# Patient Record
Sex: FEMALE | NOT HISPANIC OR LATINO | Employment: UNEMPLOYED | URBAN - METROPOLITAN AREA
[De-identification: names, ages, dates, MRNs, and addresses within clinical notes are randomized per-mention and may not be internally consistent; named-entity substitution may affect disease eponyms.]

---

## 2023-10-25 ENCOUNTER — HOSPITAL ENCOUNTER (INPATIENT)
Facility: HOSPITAL | Age: 33
LOS: 2 days | Discharge: HOME | DRG: 871 | End: 2023-10-28
Attending: EMERGENCY MEDICINE | Admitting: STUDENT IN AN ORGANIZED HEALTH CARE EDUCATION/TRAINING PROGRAM
Payer: COMMERCIAL

## 2023-10-25 ENCOUNTER — APPOINTMENT (OUTPATIENT)
Dept: RADIOLOGY | Facility: HOSPITAL | Age: 33
DRG: 871 | End: 2023-10-25
Attending: PHYSICIAN ASSISTANT
Payer: COMMERCIAL

## 2023-10-25 DIAGNOSIS — J45.901 ASTHMA EXACERBATION: ICD-10-CM

## 2023-10-25 DIAGNOSIS — E61.1 IRON DEFICIENCY: Primary | ICD-10-CM

## 2023-10-25 DIAGNOSIS — R00.0 SINUS TACHYCARDIA: ICD-10-CM

## 2023-10-25 DIAGNOSIS — J18.9 PNEUMONIA OF LEFT LOWER LOBE DUE TO INFECTIOUS ORGANISM: ICD-10-CM

## 2023-10-25 DIAGNOSIS — A41.9 SEPSIS (H): ICD-10-CM

## 2023-10-25 LAB
ANION GAP SERPL CALCULATED.3IONS-SCNC: 17 MMOL/L (ref 7–15)
BUN SERPL-MCNC: 11.7 MG/DL (ref 6–20)
CALCIUM SERPL-MCNC: 9 MG/DL (ref 8.6–10)
CHLORIDE SERPL-SCNC: 101 MMOL/L (ref 98–107)
CREAT SERPL-MCNC: 0.58 MG/DL (ref 0.51–0.95)
DEPRECATED HCO3 PLAS-SCNC: 17 MMOL/L (ref 22–29)
EGFRCR SERPLBLD CKD-EPI 2021: >90 ML/MIN/1.73M2
ERYTHROCYTE [DISTWIDTH] IN BLOOD BY AUTOMATED COUNT: 14.5 % (ref 10–15)
FLUAV RNA SPEC QL NAA+PROBE: NEGATIVE
FLUBV RNA RESP QL NAA+PROBE: NEGATIVE
GLUCOSE SERPL-MCNC: 177 MG/DL (ref 70–99)
GROUP A STREP BY PCR: NOT DETECTED
HCG SERPL QL: NEGATIVE
HCT VFR BLD AUTO: 40.9 % (ref 35–47)
HGB BLD-MCNC: 13 G/DL (ref 11.7–15.7)
LACTATE SERPL-SCNC: 1.3 MMOL/L (ref 0.7–2)
MAGNESIUM SERPL-MCNC: 1.7 MG/DL (ref 1.7–2.3)
MCH RBC QN AUTO: 24.5 PG (ref 26.5–33)
MCHC RBC AUTO-ENTMCNC: 31.8 G/DL (ref 31.5–36.5)
MCV RBC AUTO: 77 FL (ref 78–100)
PLATELET # BLD AUTO: 127 10E3/UL (ref 150–450)
POTASSIUM SERPL-SCNC: 3.2 MMOL/L (ref 3.4–5.3)
RBC # BLD AUTO: 5.3 10E6/UL (ref 3.8–5.2)
RSV RNA SPEC NAA+PROBE: NEGATIVE
SARS-COV-2 RNA RESP QL NAA+PROBE: NEGATIVE
SODIUM SERPL-SCNC: 135 MMOL/L (ref 135–145)
TSH SERPL DL<=0.005 MIU/L-ACNC: 2.22 UIU/ML (ref 0.3–4.2)
WBC # BLD AUTO: 21 10E3/UL (ref 4–11)

## 2023-10-25 PROCEDURE — 36415 COLL VENOUS BLD VENIPUNCTURE: CPT | Performed by: PHYSICIAN ASSISTANT

## 2023-10-25 PROCEDURE — 80048 BASIC METABOLIC PNL TOTAL CA: CPT | Performed by: PHYSICIAN ASSISTANT

## 2023-10-25 PROCEDURE — 93005 ELECTROCARDIOGRAM TRACING: CPT | Performed by: EMERGENCY MEDICINE

## 2023-10-25 PROCEDURE — 93005 ELECTROCARDIOGRAM TRACING: CPT | Performed by: PHYSICIAN ASSISTANT

## 2023-10-25 PROCEDURE — 99285 EMERGENCY DEPT VISIT HI MDM: CPT | Mod: 25

## 2023-10-25 PROCEDURE — 250N000013 HC RX MED GY IP 250 OP 250 PS 637: Performed by: PHYSICIAN ASSISTANT

## 2023-10-25 PROCEDURE — 84703 CHORIONIC GONADOTROPIN ASSAY: CPT | Performed by: PHYSICIAN ASSISTANT

## 2023-10-25 PROCEDURE — 83735 ASSAY OF MAGNESIUM: CPT | Performed by: PHYSICIAN ASSISTANT

## 2023-10-25 PROCEDURE — 87637 SARSCOV2&INF A&B&RSV AMP PRB: CPT | Performed by: PHYSICIAN ASSISTANT

## 2023-10-25 PROCEDURE — 71046 X-RAY EXAM CHEST 2 VIEWS: CPT

## 2023-10-25 PROCEDURE — 250N000009 HC RX 250: Performed by: PHYSICIAN ASSISTANT

## 2023-10-25 PROCEDURE — 94640 AIRWAY INHALATION TREATMENT: CPT

## 2023-10-25 PROCEDURE — 258N000003 HC RX IP 258 OP 636: Performed by: PHYSICIAN ASSISTANT

## 2023-10-25 PROCEDURE — 250N000012 HC RX MED GY IP 250 OP 636 PS 637: Performed by: PHYSICIAN ASSISTANT

## 2023-10-25 PROCEDURE — 96360 HYDRATION IV INFUSION INIT: CPT

## 2023-10-25 PROCEDURE — 84443 ASSAY THYROID STIM HORMONE: CPT | Performed by: PHYSICIAN ASSISTANT

## 2023-10-25 PROCEDURE — 85027 COMPLETE CBC AUTOMATED: CPT | Performed by: PHYSICIAN ASSISTANT

## 2023-10-25 PROCEDURE — 87651 STREP A DNA AMP PROBE: CPT | Performed by: PHYSICIAN ASSISTANT

## 2023-10-25 PROCEDURE — 83605 ASSAY OF LACTIC ACID: CPT | Performed by: PHYSICIAN ASSISTANT

## 2023-10-25 RX ORDER — ALBUTEROL SULFATE 0.83 MG/ML
5 SOLUTION RESPIRATORY (INHALATION) ONCE
Status: COMPLETED | OUTPATIENT
Start: 2023-10-25 | End: 2023-10-25

## 2023-10-25 RX ORDER — ACETAMINOPHEN 325 MG/1
975 TABLET ORAL ONCE
Status: COMPLETED | OUTPATIENT
Start: 2023-10-25 | End: 2023-10-25

## 2023-10-25 RX ORDER — IBUPROFEN 600 MG/1
600 TABLET, FILM COATED ORAL ONCE
Status: COMPLETED | OUTPATIENT
Start: 2023-10-25 | End: 2023-10-25

## 2023-10-25 RX ORDER — IPRATROPIUM BROMIDE AND ALBUTEROL SULFATE 2.5; .5 MG/3ML; MG/3ML
3 SOLUTION RESPIRATORY (INHALATION) ONCE
Status: COMPLETED | OUTPATIENT
Start: 2023-10-25 | End: 2023-10-25

## 2023-10-25 RX ORDER — PREDNISONE 20 MG/1
40 TABLET ORAL ONCE
Status: COMPLETED | OUTPATIENT
Start: 2023-10-25 | End: 2023-10-25

## 2023-10-25 RX ADMIN — ALBUTEROL SULFATE 5 MG: 2.5 SOLUTION RESPIRATORY (INHALATION) at 21:58

## 2023-10-25 RX ADMIN — ALBUTEROL SULFATE 5 MG: 2.5 SOLUTION RESPIRATORY (INHALATION) at 21:46

## 2023-10-25 RX ADMIN — IPRATROPIUM BROMIDE AND ALBUTEROL SULFATE 3 ML: 2.5; .5 SOLUTION RESPIRATORY (INHALATION) at 21:58

## 2023-10-25 RX ADMIN — IBUPROFEN 600 MG: 600 TABLET, FILM COATED ORAL at 20:31

## 2023-10-25 RX ADMIN — ACETAMINOPHEN 975 MG: 325 TABLET ORAL at 20:30

## 2023-10-25 RX ADMIN — PREDNISONE 40 MG: 20 TABLET ORAL at 21:49

## 2023-10-25 RX ADMIN — SODIUM CHLORIDE 1000 ML: 9 INJECTION, SOLUTION INTRAVENOUS at 23:30

## 2023-10-25 ASSESSMENT — ACTIVITIES OF DAILY LIVING (ADL)
ADLS_ACUITY_SCORE: 33
ADLS_ACUITY_SCORE: 35

## 2023-10-26 ENCOUNTER — APPOINTMENT (OUTPATIENT)
Dept: CT IMAGING | Facility: HOSPITAL | Age: 33
DRG: 871 | End: 2023-10-26
Attending: STUDENT IN AN ORGANIZED HEALTH CARE EDUCATION/TRAINING PROGRAM
Payer: COMMERCIAL

## 2023-10-26 PROBLEM — J45.901 ASTHMA EXACERBATION: Status: ACTIVE | Noted: 2023-10-26

## 2023-10-26 PROBLEM — J18.9 PNEUMONIA OF LEFT LOWER LOBE DUE TO INFECTIOUS ORGANISM: Status: ACTIVE | Noted: 2023-10-26

## 2023-10-26 PROBLEM — A41.9 SEPSIS (H): Status: ACTIVE | Noted: 2023-10-26

## 2023-10-26 PROBLEM — R00.0 SINUS TACHYCARDIA: Status: ACTIVE | Noted: 2023-10-26

## 2023-10-26 LAB
ALBUMIN UR-MCNC: 10 MG/DL
ALBUMIN UR-MCNC: 10 MG/DL
ANION GAP SERPL CALCULATED.3IONS-SCNC: 13 MMOL/L (ref 7–15)
APPEARANCE UR: CLEAR
APPEARANCE UR: CLEAR
BILIRUB UR QL STRIP: NEGATIVE
BILIRUB UR QL STRIP: NEGATIVE
BUN SERPL-MCNC: 7.7 MG/DL (ref 6–20)
CALCIUM SERPL-MCNC: 8 MG/DL (ref 8.6–10)
CHLORIDE SERPL-SCNC: 106 MMOL/L (ref 98–107)
COLOR UR AUTO: ABNORMAL
COLOR UR AUTO: ABNORMAL
CREAT SERPL-MCNC: 0.46 MG/DL (ref 0.51–0.95)
DEPRECATED HCO3 PLAS-SCNC: 17 MMOL/L (ref 22–29)
EGFRCR SERPLBLD CKD-EPI 2021: >90 ML/MIN/1.73M2
ERYTHROCYTE [DISTWIDTH] IN BLOOD BY AUTOMATED COUNT: 14.3 % (ref 10–15)
FERRITIN SERPL-MCNC: 70 NG/ML (ref 6–175)
GLUCOSE BLDC GLUCOMTR-MCNC: 204 MG/DL (ref 70–99)
GLUCOSE SERPL-MCNC: 223 MG/DL (ref 70–99)
GLUCOSE UR STRIP-MCNC: >1000 MG/DL
GLUCOSE UR STRIP-MCNC: NEGATIVE MG/DL
HBA1C MFR BLD: 6.4 %
HCT VFR BLD AUTO: 37.1 % (ref 35–47)
HGB BLD-MCNC: 11.7 G/DL (ref 11.7–15.7)
HGB UR QL STRIP: ABNORMAL
HGB UR QL STRIP: ABNORMAL
IRON BINDING CAPACITY (ROCHE): 259 UG/DL (ref 240–430)
IRON SATN MFR SERPL: 6 % (ref 15–46)
IRON SERPL-MCNC: 16 UG/DL (ref 37–145)
KETONES UR STRIP-MCNC: 60 MG/DL
KETONES UR STRIP-MCNC: ABNORMAL MG/DL
LEUKOCYTE ESTERASE UR QL STRIP: ABNORMAL
LEUKOCYTE ESTERASE UR QL STRIP: NEGATIVE
MCH RBC QN AUTO: 24.5 PG (ref 26.5–33)
MCHC RBC AUTO-ENTMCNC: 31.5 G/DL (ref 31.5–36.5)
MCV RBC AUTO: 78 FL (ref 78–100)
MUCOUS THREADS #/AREA URNS LPF: PRESENT /LPF
MUCOUS THREADS #/AREA URNS LPF: PRESENT /LPF
NITRATE UR QL: NEGATIVE
NITRATE UR QL: NEGATIVE
PH UR STRIP: 5.5 [PH] (ref 5–7)
PH UR STRIP: 5.5 [PH] (ref 5–7)
PLATELET # BLD AUTO: 127 10E3/UL (ref 150–450)
POTASSIUM SERPL-SCNC: 3.6 MMOL/L (ref 3.4–5.3)
RBC # BLD AUTO: 4.77 10E6/UL (ref 3.8–5.2)
RBC URINE: 1 /HPF
RBC URINE: 2 /HPF
RETICS # AUTO: 0.1 10E6/UL (ref 0.01–0.11)
RETICS/RBC NFR AUTO: 2 % (ref 0.8–2.7)
SODIUM SERPL-SCNC: 136 MMOL/L (ref 135–145)
SP GR UR STRIP: 1.02 (ref 1–1.03)
SP GR UR STRIP: 1.02 (ref 1–1.03)
SQUAMOUS EPITHELIAL: 3 /HPF
SQUAMOUS EPITHELIAL: 4 /HPF
UROBILINOGEN UR STRIP-MCNC: <2 MG/DL
UROBILINOGEN UR STRIP-MCNC: <2 MG/DL
WBC # BLD AUTO: 21.3 10E3/UL (ref 4–11)
WBC URINE: 2 /HPF
WBC URINE: 6 /HPF

## 2023-10-26 PROCEDURE — 250N000012 HC RX MED GY IP 250 OP 636 PS 637: Performed by: STUDENT IN AN ORGANIZED HEALTH CARE EDUCATION/TRAINING PROGRAM

## 2023-10-26 PROCEDURE — 258N000003 HC RX IP 258 OP 636: Performed by: PHYSICIAN ASSISTANT

## 2023-10-26 PROCEDURE — 87899 AGENT NOS ASSAY W/OPTIC: CPT | Performed by: STUDENT IN AN ORGANIZED HEALTH CARE EDUCATION/TRAINING PROGRAM

## 2023-10-26 PROCEDURE — 83550 IRON BINDING TEST: CPT | Performed by: STUDENT IN AN ORGANIZED HEALTH CARE EDUCATION/TRAINING PROGRAM

## 2023-10-26 PROCEDURE — 81001 URINALYSIS AUTO W/SCOPE: CPT | Performed by: PHYSICIAN ASSISTANT

## 2023-10-26 PROCEDURE — 82728 ASSAY OF FERRITIN: CPT | Performed by: STUDENT IN AN ORGANIZED HEALTH CARE EDUCATION/TRAINING PROGRAM

## 2023-10-26 PROCEDURE — 87077 CULTURE AEROBIC IDENTIFY: CPT | Performed by: PHYSICIAN ASSISTANT

## 2023-10-26 PROCEDURE — 36415 COLL VENOUS BLD VENIPUNCTURE: CPT | Performed by: STUDENT IN AN ORGANIZED HEALTH CARE EDUCATION/TRAINING PROGRAM

## 2023-10-26 PROCEDURE — 80048 BASIC METABOLIC PNL TOTAL CA: CPT | Performed by: STUDENT IN AN ORGANIZED HEALTH CARE EDUCATION/TRAINING PROGRAM

## 2023-10-26 PROCEDURE — 999N000248 HC STATISTIC IV INSERT WITH US BY RN

## 2023-10-26 PROCEDURE — 87040 BLOOD CULTURE FOR BACTERIA: CPT | Performed by: STUDENT IN AN ORGANIZED HEALTH CARE EDUCATION/TRAINING PROGRAM

## 2023-10-26 PROCEDURE — 83036 HEMOGLOBIN GLYCOSYLATED A1C: CPT | Performed by: STUDENT IN AN ORGANIZED HEALTH CARE EDUCATION/TRAINING PROGRAM

## 2023-10-26 PROCEDURE — 250N000013 HC RX MED GY IP 250 OP 250 PS 637: Performed by: STUDENT IN AN ORGANIZED HEALTH CARE EDUCATION/TRAINING PROGRAM

## 2023-10-26 PROCEDURE — 87149 DNA/RNA DIRECT PROBE: CPT | Performed by: PHYSICIAN ASSISTANT

## 2023-10-26 PROCEDURE — 71275 CT ANGIOGRAPHY CHEST: CPT

## 2023-10-26 PROCEDURE — 82607 VITAMIN B-12: CPT | Performed by: STUDENT IN AN ORGANIZED HEALTH CARE EDUCATION/TRAINING PROGRAM

## 2023-10-26 PROCEDURE — 36415 COLL VENOUS BLD VENIPUNCTURE: CPT | Performed by: PHYSICIAN ASSISTANT

## 2023-10-26 PROCEDURE — 250N000011 HC RX IP 250 OP 636: Performed by: PHYSICIAN ASSISTANT

## 2023-10-26 PROCEDURE — 250N000011 HC RX IP 250 OP 636: Mod: JZ | Performed by: STUDENT IN AN ORGANIZED HEALTH CARE EDUCATION/TRAINING PROGRAM

## 2023-10-26 PROCEDURE — 85045 AUTOMATED RETICULOCYTE COUNT: CPT | Performed by: STUDENT IN AN ORGANIZED HEALTH CARE EDUCATION/TRAINING PROGRAM

## 2023-10-26 PROCEDURE — 250N000011 HC RX IP 250 OP 636: Performed by: STUDENT IN AN ORGANIZED HEALTH CARE EDUCATION/TRAINING PROGRAM

## 2023-10-26 PROCEDURE — 81001 URINALYSIS AUTO W/SCOPE: CPT | Performed by: STUDENT IN AN ORGANIZED HEALTH CARE EDUCATION/TRAINING PROGRAM

## 2023-10-26 PROCEDURE — 85027 COMPLETE CBC AUTOMATED: CPT | Performed by: STUDENT IN AN ORGANIZED HEALTH CARE EDUCATION/TRAINING PROGRAM

## 2023-10-26 PROCEDURE — 120N000001 HC R&B MED SURG/OB

## 2023-10-26 PROCEDURE — 99223 1ST HOSP IP/OBS HIGH 75: CPT | Mod: AI | Performed by: STUDENT IN AN ORGANIZED HEALTH CARE EDUCATION/TRAINING PROGRAM

## 2023-10-26 PROCEDURE — 258N000003 HC RX IP 258 OP 636: Performed by: STUDENT IN AN ORGANIZED HEALTH CARE EDUCATION/TRAINING PROGRAM

## 2023-10-26 PROCEDURE — 99207 PR APP CREDIT; MD BILLING SHARED VISIT: CPT | Performed by: STUDENT IN AN ORGANIZED HEALTH CARE EDUCATION/TRAINING PROGRAM

## 2023-10-26 RX ORDER — ACETAMINOPHEN 325 MG/1
650 TABLET ORAL EVERY 6 HOURS PRN
Status: DISCONTINUED | OUTPATIENT
Start: 2023-10-26 | End: 2023-10-28 | Stop reason: HOSPADM

## 2023-10-26 RX ORDER — IOPAMIDOL 755 MG/ML
90 INJECTION, SOLUTION INTRAVASCULAR ONCE
Status: COMPLETED | OUTPATIENT
Start: 2023-10-26 | End: 2023-10-26

## 2023-10-26 RX ORDER — IPRATROPIUM BROMIDE AND ALBUTEROL SULFATE 2.5; .5 MG/3ML; MG/3ML
3 SOLUTION RESPIRATORY (INHALATION) EVERY 4 HOURS PRN
Status: DISCONTINUED | OUTPATIENT
Start: 2023-10-26 | End: 2023-10-27

## 2023-10-26 RX ORDER — PREDNISONE 20 MG/1
40 TABLET ORAL DAILY
Status: DISCONTINUED | OUTPATIENT
Start: 2023-10-26 | End: 2023-10-28 | Stop reason: HOSPADM

## 2023-10-26 RX ORDER — FLUTICASONE PROPIONATE 50 MCG
2 SPRAY, SUSPENSION (ML) NASAL PRN
COMMUNITY
Start: 2023-09-22

## 2023-10-26 RX ORDER — FLUTICASONE FUROATE AND VILANTEROL 200; 25 UG/1; UG/1
1 POWDER RESPIRATORY (INHALATION) DAILY
Status: DISCONTINUED | OUTPATIENT
Start: 2023-10-26 | End: 2023-10-28 | Stop reason: HOSPADM

## 2023-10-26 RX ORDER — SODIUM CHLORIDE 9 MG/ML
INJECTION, SOLUTION INTRAVENOUS CONTINUOUS
Status: DISCONTINUED | OUTPATIENT
Start: 2023-10-26 | End: 2023-10-28 | Stop reason: HOSPADM

## 2023-10-26 RX ORDER — AZELASTINE 1 MG/ML
1 SPRAY, METERED NASAL 2 TIMES DAILY
COMMUNITY
Start: 2023-05-15

## 2023-10-26 RX ORDER — PROCHLORPERAZINE 25 MG
25 SUPPOSITORY, RECTAL RECTAL EVERY 12 HOURS PRN
Status: DISCONTINUED | OUTPATIENT
Start: 2023-10-26 | End: 2023-10-28 | Stop reason: HOSPADM

## 2023-10-26 RX ORDER — DEXTROSE MONOHYDRATE 25 G/50ML
25-50 INJECTION, SOLUTION INTRAVENOUS
Status: DISCONTINUED | OUTPATIENT
Start: 2023-10-26 | End: 2023-10-28 | Stop reason: HOSPADM

## 2023-10-26 RX ORDER — IPRATROPIUM BROMIDE AND ALBUTEROL SULFATE 2.5; .5 MG/3ML; MG/3ML
3 SOLUTION RESPIRATORY (INHALATION) EVERY 6 HOURS PRN
Status: DISCONTINUED | OUTPATIENT
Start: 2023-10-26 | End: 2023-10-26 | Stop reason: ALTCHOICE

## 2023-10-26 RX ORDER — PROCHLORPERAZINE MALEATE 10 MG
10 TABLET ORAL EVERY 6 HOURS PRN
Status: DISCONTINUED | OUTPATIENT
Start: 2023-10-26 | End: 2023-10-28 | Stop reason: HOSPADM

## 2023-10-26 RX ORDER — ACETAMINOPHEN 325 MG/1
975 TABLET ORAL EVERY 8 HOURS PRN
Status: DISCONTINUED | OUTPATIENT
Start: 2023-10-26 | End: 2023-10-26 | Stop reason: ALTCHOICE

## 2023-10-26 RX ORDER — ONDANSETRON 2 MG/ML
4 INJECTION INTRAMUSCULAR; INTRAVENOUS EVERY 6 HOURS PRN
Status: DISCONTINUED | OUTPATIENT
Start: 2023-10-26 | End: 2023-10-28 | Stop reason: HOSPADM

## 2023-10-26 RX ORDER — FLUTICASONE PROPIONATE AND SALMETEROL XINAFOATE 115; 21 UG/1; UG/1
2 AEROSOL, METERED RESPIRATORY (INHALATION) 2 TIMES DAILY
COMMUNITY
Start: 2023-09-26

## 2023-10-26 RX ORDER — ACETAMINOPHEN 650 MG/1
650 SUPPOSITORY RECTAL EVERY 6 HOURS PRN
Status: DISCONTINUED | OUTPATIENT
Start: 2023-10-26 | End: 2023-10-28 | Stop reason: HOSPADM

## 2023-10-26 RX ORDER — CEFTRIAXONE 2 G/1
2 INJECTION, POWDER, FOR SOLUTION INTRAMUSCULAR; INTRAVENOUS ONCE
Status: COMPLETED | OUTPATIENT
Start: 2023-10-26 | End: 2023-10-26

## 2023-10-26 RX ORDER — MAGNESIUM SULFATE 4 G/50ML
4 INJECTION INTRAVENOUS ONCE
Status: COMPLETED | OUTPATIENT
Start: 2023-10-26 | End: 2023-10-26

## 2023-10-26 RX ORDER — ENOXAPARIN SODIUM 100 MG/ML
40 INJECTION SUBCUTANEOUS EVERY 24 HOURS
Status: DISCONTINUED | OUTPATIENT
Start: 2023-10-26 | End: 2023-10-28 | Stop reason: HOSPADM

## 2023-10-26 RX ORDER — ALBUTEROL SULFATE 90 UG/1
2 AEROSOL, METERED RESPIRATORY (INHALATION) EVERY 6 HOURS PRN
COMMUNITY

## 2023-10-26 RX ORDER — ONDANSETRON 4 MG/1
4 TABLET, ORALLY DISINTEGRATING ORAL EVERY 6 HOURS PRN
Status: DISCONTINUED | OUTPATIENT
Start: 2023-10-26 | End: 2023-10-28 | Stop reason: HOSPADM

## 2023-10-26 RX ORDER — ALBUTEROL SULFATE 90 UG/1
2 AEROSOL, METERED RESPIRATORY (INHALATION) EVERY 6 HOURS PRN
Status: DISCONTINUED | OUTPATIENT
Start: 2023-10-26 | End: 2023-10-28 | Stop reason: HOSPADM

## 2023-10-26 RX ORDER — NICOTINE POLACRILEX 4 MG
15-30 LOZENGE BUCCAL
Status: DISCONTINUED | OUTPATIENT
Start: 2023-10-26 | End: 2023-10-28 | Stop reason: HOSPADM

## 2023-10-26 RX ORDER — CEFTRIAXONE 1 G/1
1 INJECTION, POWDER, FOR SOLUTION INTRAMUSCULAR; INTRAVENOUS EVERY 24 HOURS
Status: DISCONTINUED | OUTPATIENT
Start: 2023-10-26 | End: 2023-10-28 | Stop reason: HOSPADM

## 2023-10-26 RX ADMIN — AZITHROMYCIN MONOHYDRATE 500 MG: 500 INJECTION, POWDER, LYOPHILIZED, FOR SOLUTION INTRAVENOUS at 21:34

## 2023-10-26 RX ADMIN — CEFTRIAXONE SODIUM 1 G: 1 INJECTION, POWDER, FOR SOLUTION INTRAMUSCULAR; INTRAVENOUS at 22:48

## 2023-10-26 RX ADMIN — IOPAMIDOL 90 ML: 755 INJECTION, SOLUTION INTRAVENOUS at 19:21

## 2023-10-26 RX ADMIN — PREDNISONE 40 MG: 20 TABLET ORAL at 14:37

## 2023-10-26 RX ADMIN — MAGNESIUM SULFATE HEPTAHYDRATE 4 G: 80 INJECTION, SOLUTION INTRAVENOUS at 20:56

## 2023-10-26 RX ADMIN — SODIUM CHLORIDE: 9 INJECTION, SOLUTION INTRAVENOUS at 09:03

## 2023-10-26 RX ADMIN — ACETAMINOPHEN 650 MG: 325 TABLET ORAL at 08:42

## 2023-10-26 RX ADMIN — AZITHROMYCIN MONOHYDRATE 500 MG: 500 INJECTION, POWDER, LYOPHILIZED, FOR SOLUTION INTRAVENOUS at 01:23

## 2023-10-26 RX ADMIN — SODIUM CHLORIDE: 9 INJECTION, SOLUTION INTRAVENOUS at 20:53

## 2023-10-26 RX ADMIN — FLUTICASONE FUROATE AND VILANTEROL TRIFENATATE 1 PUFF: 200; 25 POWDER RESPIRATORY (INHALATION) at 08:37

## 2023-10-26 RX ADMIN — CEFTRIAXONE SODIUM 2 G: 2 INJECTION, POWDER, FOR SOLUTION INTRAMUSCULAR; INTRAVENOUS at 00:35

## 2023-10-26 ASSESSMENT — ACTIVITIES OF DAILY LIVING (ADL)
ADLS_ACUITY_SCORE: 35
ADLS_ACUITY_SCORE: 18

## 2023-10-26 NOTE — PROGRESS NOTES
Care Management Follow Up    Length of Stay (days): 0    Expected Discharge Date: 10/28/2023     Concerns to be Addressed:       Patient plan of care discussed at interdisciplinary rounds: Yes    Anticipated Discharge Disposition: Home       Referrals Placed by CM/SW:    Private pay costs discussed: Not applicable    Additional Information:  Chart reviewed, pt is independent at baseline.  Anticipate pt will return home at discharge.  Care Management available for any discharge needs.    KAM Pittman

## 2023-10-26 NOTE — ED TRIAGE NOTES
The patient reports that she has had pain in her throat and a headache since yesterday. She also reports body aches and chills. She denies nausea.

## 2023-10-26 NOTE — ED PROVIDER NOTES
Emergency Department Staff Physician Note     I had a face to face encounter with this patient seen by the Advanced Practice Provider (MARIA A).  I have seen, examined, and discussed the patient with the MARIA A and agree with their assessment and plan of management.    Relevant HPI:     Claire Mora is a 33 year old female who presents to the Emergency Department for evaluation of sore throat, chest tightness, headache. History of asthma, tried albuterol inhaler without relief.    ED Course:  12:00 AM I received the patient report from the MARIA A, Laura Aceves PA-C. I agree with their assessment and plan of management, and I will see the patient.  12:14 AM I met with the patient to introduce myself, gather additional history, perform my initial exam, and discuss the plan.     Brief Physical Exam:  Vitals: /51   Pulse 93   Temp 97.8  F (36.6  C) (Oral)   Resp 24   Wt 79.8 kg (176 lb)   LMP 09/30/2023 (Approximate)   SpO2 96%   General: Appears in no acute distress, awake, alert, interactive.  Eyes: Conjunctivae non-injected. Sclera anicteric.  HENT: Atraumatic, normocephalic  Neck: Supple, normal ROM  Respiratory/Chest: Respiration unlabored, no tachypnea  Abdomen: non distended  Musculoskeletal: Normal extremities. No edema or erythema.  Skin: Normal color. No rash or diaphoresis.  Neurologic: Alert and oriented, face symmetric, moves all extremities spontaneously. Speech clear.  Psychiatric:  Affect normal, Judgment normal, Mood normal.        Impression / ED Plan:  I had a face to face encounter with this patient seen by the Advanced Practice Provider (MARIA A).  I have seen, examined, and discussed the patient with the MARIA A and agree with their assessment and plan of management. I personally saw the patient and performed a substantive portion of the visit including all aspects of the medical decision making.    Claire Mroa is a 33 year old female presents to the ED for evaluation of tachycardia with shortness  of breath and cough.  Chest x-ray concerning for likely pneumonia with elevated white count.  Clinically no concern for PE.  Do believe the pneumonia likely triggered an asthma exacerbation.  Despite nebs, we have not been able to get the tachycardia to improve, and with a white count, and signs of infection on chest x-ray, believe that the patient would benefit from inpatient mission for antibiotics that she does meet sepsis criteria.    1. Sepsis (H)    2. Sinus tachycardia    3. Asthma exacerbation    4. Pneumonia of left lower lobe due to infectious organism        I, Baljit Mitchell, am serving as a scribe to document services personally performed by Tu Stapleton D.O., based on my observations and the provider's statements to me. I, Tu Stapleton D.O., attest that Baljit Mitchell is acting in a scribe capacity, has observed my performance of the services and has documented them in accordance with my direction.    Tu Stapleton D.O.  Emergency Medicine  [facility] EMERGENCY DEPARTMENT  [address] [place] [id number]  [phone]  Dept: [phone]       Tu Stapleton,   10/26/23 0320

## 2023-10-26 NOTE — PROGRESS NOTES
[facility]    Medicine Progress Note - Hospitalist Service    Date of Admission:  10/25/2023    Assessment & Plan   Assessment:  Claire Mora is a 33F presenting with general malaise, sore throat, urinary frequency; pmhx includes asthma, Hep B carrier; admitted for suspected urosepsis.    Problem list and plan:  Asthma exacerbation  Possible pneumonia  Suspected sepsis possibly in the setting of pneumonia  Tachycardia possibly in the setting of asthma/pneumonia resolved  High anion gap metabolic acidosis resolving  Leukocytosis most likely in setting of pneumonia  Breo (replaces advair inpatient) 1 puff every day  Continue IV ceftriaxone and azithromycin  Continue with bronchodilators and DuoNeb  Continue with oral steroids  Receiving fluid resuscitation  Chest x-ray shows left basilar infiltrate, ordered CT scan of the chest both to rule out PE/pneumonia  Viral panel negative for COVID, flu, respiratory syncytial virus  Group A streptococcus PCR throat not detected  Will order urine Legionella and Streptococcus pneumonia antigen along with full viral panel  Monitor fever and WBC curve    UTI ruled out  Received fluids  1 set of blood culture ordered which is currently pending, ordered 2 more sets  Urinalysis has low suspicion for infection so urine culture was not indicated  Received ceftriaxone    Electrolyte abnormality/hypokalemia/hypomagnesemia  Repleted electrolytes    Microcytic anemia and thrombocytopenia  Monitor CBC  Follow-up iron panel  Platelet currently appears to be stable    Hyperglycemia secondary to steroid use  Monitor glucose levels  Follow-up hemoglobin A1c  Started on sliding scale            Diet: Combination Diet Regular Diet Adult    DVT Prophylaxis: Enoxaparin (Lovenox) SQ  Sainz Catheter: Not present  Lines: None     Cardiac Monitoring: ACTIVE order. Indication: Tachyarrhythmias, acute (48 hours)  Code Status: Full Code      Clinically Significant Risk Factors  "Present on Admission        # Hypokalemia: Lowest K = 3.2 mmol/L in last 2 days, will replace as needed         # Thrombocytopenia: Lowest platelets = 127 in last 2 days, will monitor for bleeding        # Obesity: Estimated body mass index is 31.18 kg/m  as calculated from the following:    Height as of this encounter: 1.6 m (5' 3\").    Weight as of this encounter: 79.8 kg (176 lb).       # Asthma: noted on problem list        Disposition Plan     Expected Discharge Date: 10/28/2023                    Saad J. Gondal, MD  Hospitalist Service  [facility]  Securely message with Zing (more info)  Text page via [facility] Paging/Directory   ______________________________________________________________________    Interval History   Patient was seen and examined at bedside, was for not feeling good, discussed with the plan with the patient at bedside    Physical Exam   Vital Signs: Temp: 97.8  F (36.6  C) Temp src: Oral BP: 108/58 Pulse: 90   Resp: 24 SpO2: 96 % O2 Device: None (Room air)    Weight: 176 lbs 0 oz    GENERAL: Patient was seen and examined at bedside with no acute distress  HENT:  Head is normocephalic, atraumatic.   EYES:  Eyes have anicteric sclerae without conjunctival injection   LUNGS: Bilateral air entry, coarse breath sounds bilaterally  CARDIOVASCULAR:  Regular rate and rhythm with no murmurs, gallops or rubs.  ABDOMEN:  Normal bowel sounds. Soft; nontender   EXT: no edema    SKIN:  No acute rashes.   NEUROLOGIC:  Grossly nonfocal.      Medical Decision Making       50 MINUTES SPENT BY ME on the date of service doing chart review, history, exam, documentation & further activities per the note.      Data     I have personally reviewed the following data over the past 24 hrs:    21.3 (H)  \   11.7   / 127 (L)     136 106 7.7 /  223 (H)   3.6 17 (L) 0.46 (L) \     TSH: 2.22 T4: N/A A1C: N/A     Procal: N/A CRP: N/A Lactic Acid: 1.3         Imaging results reviewed over the past 24 " hrs:   Recent Results (from the past 24 hour(s))   Chest XR,  PA & LAT    Narrative    EXAM: XR CHEST 2 VIEWS  LOCATION: [facility]  DATE: 10/25/2023    INDICATION: cough, asthma  COMPARISON: None.    FINDINGS: The heart size is normal. There is mild left basilar atelectasis or scarring. The lungs are otherwise clear. There is no pneumothorax or pleural effusion.      Impression    IMPRESSION: Mild left basilar infiltrate or scarring.

## 2023-10-26 NOTE — MEDICATION SCRIBE - ADMISSION MEDICATION HISTORY
Medication Scribe Admission Medication History    Admission medication history is complete. The information provided in this note is only as accurate as the sources available at the time of the update.    Information Source(s): Patient and Family member via in-person    Pertinent Information: Patient reports only taking medications for her breathing, no pills.    Changes made to PTA medication list:  Added: All medictions listed below.  Deleted: None  Changed: None    Medication Affordability:  Not including over the counter (OTC) medications, was there a time in the past 3 months when you did not take your medications as prescribed because of cost?: No    Allergies reviewed with patient and updates made in EHR: yes    Medication History Completed By: Leoncio Leija 10/26/2023 1:09 AM    PTA Med List   Medication Sig Last Dose    ADVAIR -21 MCG/ACT inhaler Inhale 2 puffs into the lungs 2 times daily 10/25/2023 at x1    albuterol (PROAIR HFA/PROVENTIL HFA/VENTOLIN HFA) 108 (90 Base) MCG/ACT inhaler Inhale 2 puffs into the lungs every 6 hours as needed for shortness of breath Past Week at prn    azelastine (ASTELIN) 0.1 % nasal spray Spray 1 spray into both nostrils 2 times daily 10/25/2023 at x1    fluticasone (FLONASE) 50 MCG/ACT nasal spray Spray 2 sprays into both nostrils as needed for allergies Past Week at prn

## 2023-10-26 NOTE — ED PROVIDER NOTES
Emergency Department Encounter   NAME: Claire Mora ; AGE: 33 year old female ; YOB: 1990 ; MRN: [id number] ; PCP: No primary care provider on file.   ED PROVIDER: Laura Aceves PA-C    Evaluation Date & Time:   No admission date for patient encounter.    CHIEF COMPLAINT:  Pharyngitis and Headache      Impression and Plan   MDM:   Claire Mora is a 33 year old female with a pertinent history of asthma who presents to the ED by walk in for evaluation of pharyngitis and headache.  Patient has had 2 days of headache, myalgias, chills, and sore throat.  Upon arrival to the ER, she was tachycardic to the 120s in triage though hemodynamically stable.  When I evaluated patient in the ED room, she was having an asthma attack with distress, increased effort, and inspiratory and expiratory wheezes throughout.  She was given DuoNeb and albuterol nebs x2 as well as prednisone with significant improvement in her symptoms.  She is now breathing comfortably with only faint inspiratory wheeze in her lower lung bases.  After albuterol administration, she is now tachycardic to the 130s-140s.  We discussed plan for EKG, blood work, and chest x-ray to further evaluate.    EKG confirmed sinus tachycardia. No chest pain or SOB outside of her asthma attack - no concern for PE. She has posterior oropharynx erythema though there is no evidence of tonsillitis, PTA, uvulitis.  She is speaking in full sentences, handling secretions, and is swallowing medications in the ER -no concern for retropharyngeal abscess, epiglottitis or bacterial tracheitis.  Strep negative.  COVID-19 and influenza swab negative.  TSH within normal limits.  Negative pregnancy test.  Laboratory work-up notable for a white count of 21, given her tachycardia and infectious symptoms, add on lactate which returned within normal limits.  No evidence of severe sepsis or shock, however patient does meet sepsis criteria.  Blood cultures sent.  CO2 17 and  anion gap 17 which I suspect is due to albuterol administration as well as her lower potassium of 3.2.  Her blood glucose was mildly elevated, although no history of DM, and only 170's - low suspicion for DKA.  Chest x-ray with questionable infiltrate versus scarring in her left lower lung base.  Unlikely scarring in her age group, and suspect bacterial pneumonia as a source of her sepsis and symptoms.  Patient was treated with IV azithromycin and Rocephin.  Headache is currently resolved.  No neck stiffness or meningeal signs on exam, no confusion, she received her meningococcal vaccine in 2017.  No concern at this time for meningitis or encephalitis.  Patient admitted to medicine for continued work-up and treatment.  Dr. Pagan has accepted the patient for admission.     *Offered patient professional interpretor, however she preferred that  interpret.    Medical Decision Making    History:  Supplemental history from: Family Member/Significant Other  External Record(s) reviewed: Documented in chart, if applicable.    Work Up:  Chart documentation includes differential considered and any EKGs or imaging independently interpreted by provider, where specified.  In additional to work up documented, I considered the following work up: Documented in chart, if applicable.    External consultation:  Discussion of management with another provider: Documented in chart, if applicable    Complicating factors:  Care impacted by chronic illness: Other: Asthma  Care affected by social determinants of health: N/A    Disposition considerations: Admit.      ED COURSE:  9:46 PM I met and introduced myself to the patient. I gathered initial history and performed my physical exam. We discussed plan for initial workup.   10:33 PM I rechecked on the patient and updated them on their lab results.  11:50 PM Rechecked the patient and updated her on results and plan for admission.   12:02 AM I have staffed the patient with   Pieter, ED MD, who has evaluated the patient and agrees with all aspects of today's care.   12:02 AM paged for admitting physician.   12:08 AM I spoke with Dr. Pagan, who accepts the patient for medical admission.    At the conclusion of the encounter I discussed the results of all the tests and the disposition. The questions were answered. The patient or family acknowledged understanding and was agreeable with the care plan.    FINAL IMPRESSION:    ICD-10-CM    1. Sepsis (H)  A41.9       2. Sinus tachycardia  R00.0       3. Asthma exacerbation  J45.901       4. Pneumonia of left lower lobe due to infectious organism  J18.9             MEDICATIONS GIVEN IN THE EMERGENCY DEPARTMENT:  Medications   cefTRIAXone (ROCEPHIN) 2 g vial to attach to  ml bag for ADULTS or NS 50 ml bag for PEDS (2 g Intravenous $New Bag 10/26/23 0035)   azithromycin (ZITHROMAX) 500 mg in sodium chloride 0.9 % 250 mL intermittent infusion (has no administration in time range)   ibuprofen (ADVIL/MOTRIN) tablet 600 mg (600 mg Oral $Given 10/25/23 2031)   acetaminophen (TYLENOL) tablet 975 mg (975 mg Oral $Given 10/25/23 2030)   ipratropium - albuterol 0.5 mg/2.5 mg/3 mL (DUONEB) neb solution 3 mL (3 mLs Nebulization $Given 10/25/23 2158)   albuterol (PROVENTIL) neb solution 5 mg (5 mg Nebulization $Given 10/25/23 2158)   albuterol (PROVENTIL) neb solution 5 mg (5 mg Nebulization $Given 10/25/23 2146)   predniSONE (DELTASONE) tablet 40 mg (40 mg Oral $Given 10/25/23 2149)   sodium chloride 0.9% BOLUS 1,000 mL (0 mLs Intravenous Stopped 10/26/23 0030)         NEW PRESCRIPTIONS STARTED AT TODAY'S ED VISIT:  New Prescriptions    No medications on file         HPI   Patient information was obtained from: Patient and   Use of Intrepreter: N/A    Claire Mora is a 33 year old female with a pertinent history of asthma who presents to the ED by walk in for evaluation of pharyngitis and headache.     The patient reports that she has a  history of asthma and has albuterol at home for her to use. Her chest was feeling tight before leaving her house so she decided to take her albuterol before heading to the ED, but when she got to the ED she felt much more short of breath and wheezy. She has been coughing while being here and is unsure she if had a fever, but she does feel warm.     Per  reports that his wife has been having a headache and sore throat for the last two days and that no one else is sick at home.     Denies nausea, vomiting, diarrhea, abdominal pain, chest pain, and neck pain.         REVIEW OF SYSTEMS:  Pertinent positive and negative symptoms per HPI.       Medical History     History reviewed. No pertinent past medical history.    History reviewed. No pertinent surgical history.    History reviewed. No pertinent family history.         No current outpatient medications on file.        Physical Exam     First Vitals:  Patient Vitals for the past 24 hrs:   BP Temp Temp src Pulse Resp SpO2   10/26/23 0000 102/50 -- -- 110 -- 95 %   10/25/23 2338 -- 97.8  F (36.6  C) Oral -- -- --   10/25/23 2333 109/58 -- -- 113 -- 96 %   10/25/23 2245 104/64 -- -- (!) 140 -- 97 %   10/25/23 2230 101/67 -- -- (!) 139 24 98 %   10/25/23 2215 96/59 -- -- (!) 139 -- 95 %   10/25/23 2200 101/57 -- -- (!) 136 24 94 %   10/25/23 2150 -- -- -- (!) 127 -- 92 %   10/25/23 2145 -- -- -- (!) 122 -- (!) 87 %   10/25/23 2033 123/66 -- -- -- 20 95 %   10/25/23 1935 126/64 99.6  F (37.6  C) Oral (!) 125 24 94 %         PHYSICAL EXAM:   Physical Exam  Vitals and nursing note reviewed.   Constitutional:       General: She is in acute distress.   HENT:      Mouth/Throat:      Mouth: Mucous membranes are moist.      Comments: Posterior oropharynx erythema present.  Uvula is midline without deviation and there is no asymmetric soft palate swelling.  No tonsillar edema or exudates.  Normal phonation.  No neck or facial fullness.  No trismus.  Handling secretions.    Neck:      Comments: No meningeal signs.  Cardiovascular:      Rate and Rhythm: Regular rhythm. Tachycardia present.      Heart sounds: Normal heart sounds. No murmur heard.  Pulmonary:      Comments: Mild respiratory distress with increased effort.  Diffuse inspiratory and expiratory wheezes throughout all lung fields.  No crackles or rails.  Abdominal:      Palpations: Abdomen is soft.      Tenderness: There is no abdominal tenderness. There is no guarding or rebound.   Musculoskeletal:      Cervical back: Normal range of motion and neck supple.   Skin:     General: Skin is warm and dry.   Neurological:      Mental Status: She is alert.             Results     LAB:  All pertinent labs reviewed and interpreted  Labs Ordered and Resulted from Time of ED Arrival to Time of ED Departure   CBC WITH PLATELETS - Abnormal       Result Value    WBC Count 21.0 (*)     RBC Count 5.30 (*)     Hemoglobin 13.0      Hematocrit 40.9      MCV 77 (*)     MCH 24.5 (*)     MCHC 31.8      RDW 14.5      Platelet Count 127 (*)    BASIC METABOLIC PANEL - Abnormal    Sodium 135      Potassium 3.2 (*)     Chloride 101      Carbon Dioxide (CO2) 17 (*)     Anion Gap 17 (*)     Urea Nitrogen 11.7      Creatinine 0.58      GFR Estimate >90      Calcium 9.0      Glucose 177 (*)    INFLUENZA A/B, RSV, & SARS-COV2 PCR - Normal    Influenza A PCR Negative      Influenza B PCR Negative      RSV PCR Negative      SARS CoV2 PCR Negative     MAGNESIUM - Normal    Magnesium 1.7     TSH WITH FREE T4 REFLEX - Normal    TSH 2.22     HCG QUALITATIVE PREGNANCY - Normal    hCG Serum Qualitative Negative     LACTIC ACID WHOLE BLOOD - Normal    Lactic Acid 1.3     GROUP A STREPTOCOCCUS PCR THROAT SWAB - Normal    Group A strep by PCR Not Detected     ROUTINE UA WITH MICROSCOPIC REFLEX TO CULTURE   BLOOD CULTURE       RADIOLOGY:  Chest XR,  PA & LAT   Final Result   IMPRESSION: Mild left basilar infiltrate or scarring.            ECG:  Performed at: 10/25/23,  22:52    Impression: Sinus tachycardia, otherwise normal ECG.    Rate: 132 BPM  Rhythm: Sinus tachycardia  Axis: 54  VA Interval: 130 ms  QRS Interval: 80 ms  QTc Interval: 465 ms  ST Changes: None  Comparison: None    EKG results reviewed and interpreted by Dr. Pieter ED MD.       I, Tona Fiore, am serving as a scribe to document services personally performed by Laura Aceves PA-C, based on my observation and the provider's statements to me. ILaura PA-C attest that Tona Fiore is acting in a scribe capacity, has observed my performance of the services and has documented them in accordance with my direction.       Laura Aceves PA-C   Emergency Medicine   [facility] EMERGENCY DEPARTMENT       Laura Aceves PA-C  10/26/23 0049

## 2023-10-26 NOTE — H&P
[facility]    History and Physical - Hospitalist Service       Date of Admission:  10/25/2023    Assessment & Plan      Claire Mora is a 33F presenting with general malaise, sore throat, urinary frequency; pmhx includes asthma, Hep B carrier; admitted for suspected urosepsis.    #UTI  #sepsis  Sepsis, present on admission. Suspected source: urinary. SIRS 3/4.  -IVF 30ml/kg  -Blood cultures pending  -Urine culture/sensitivities pending  -ceftriaxone 1g IV every day (end goal date 10/30)    #asthma exacerbation  -duoneb q4h/PRN  -Breo (replaces advair inpatient) 1 puff every day  -albuterol INH q4h/PRN    #hypokalemia  -BMP trend  -replete as indicated    #microcytosis  -CBC trend    #hyperglycemia  -BMP trend            Diet: Combination Diet Regular Diet Adult  DVT Prophylaxis: Enoxaparin (Lovenox) SQ  Sainz Catheter: Not present  Lines: None     Cardiac Monitoring: ACTIVE order. Indication: Tachyarrhythmias, acute (48 hours)  Code Status: Full Code    Clinically Significant Risk Factors Present on Admission        # Hypokalemia: Lowest K = 3.2 mmol/L in last 2 days, will replace as needed         # Thrombocytopenia: Lowest platelets = 127 in last 2 days, will monitor for bleeding            # Asthma: noted on problem list        Disposition Plan      Expected Discharge Date: 10/28/2023                  Ramesh Pagan MD  Hospitalist Service  [facility]  Securely message with [employer] (more info)  Text page via [employer] Paging/Directory     ______________________________________________________________________    Chief Complaint   Generalised weakness, body aches, sore throat    History is obtained from the patient    History of Present Illness   Claire Mora is a 33F presenting with general malaise, sore throat, urinary frequency; pmhx includes asthma, Hep B carrier; admitted for suspected urosepsis.    Was in usual state of health until approximately 2 days ago  when she started develop generalized weakness, over body pins and needle sensation with myalgias.  Has noted to have increased frequency urinary frequency in the last 2 to 3 days, but no dysuria, urgency.  Has had sore throat at the same onset, no recalled specific sick contacts.  Did have acute dyspnea and shortness of breath today while in the emergency room, however notes that she has well-controlled asthma and very rarely maybe once every 2 to 3 weeks needs to use rescue inhaler.      Past Medical History    History reviewed. No pertinent past medical history.    Past Surgical History   History reviewed. No pertinent surgical history.    Prior to Admission Medications   Prior to Admission Medications   Prescriptions Last Dose Informant Patient Reported? Taking?   ADVAIR -21 MCG/ACT inhaler 10/25/2023 at x1 Self Yes Yes   Sig: Inhale 2 puffs into the lungs 2 times daily   albuterol (PROAIR HFA/PROVENTIL HFA/VENTOLIN HFA) 108 (90 Base) MCG/ACT inhaler Past Week at prn Self Yes Yes   Sig: Inhale 2 puffs into the lungs every 6 hours as needed for shortness of breath   azelastine (ASTELIN) 0.1 % nasal spray 10/25/2023 at x1 Self Yes Yes   Sig: Spray 1 spray into both nostrils 2 times daily   fluticasone (FLONASE) 50 MCG/ACT nasal spray Past Week at prn Self Yes Yes   Sig: Spray 2 sprays into both nostrils as needed for allergies      Facility-Administered Medications: None        Review of Systems    The 10 point Review of Systems is negative other than noted in the HPI or here.      Physical Exam   Vital Signs: Temp: 97.8  F (36.6  C) Temp src: Oral BP: 108/51 Pulse: 93   Resp: 24 SpO2: 96 % O2 Device: None (Room air)    Weight: 176 lbs 0 oz    Constitutional: awake, alert, cooperative, no apparent distress, and appears stated age  Respiratory: mild expiratory wheeze  Cardiovascular: RRR no m/g/r  GI: soft, nontender, nondistended  Musculoskeletal: shoulder/elbow flexion/extension 5/5 bilaterally and  symmetric  Neurologic: AOx4, no focal deficits    Medical Decision Making       45 MINUTES SPENT BY ME on the date of service doing chart review, history, exam, documentation & further activities per the note.  MANAGEMENT DISCUSSED with the following over the past 24 hours: patient, spouse   NOTE(S)/MEDICAL RECORDS REVIEWED over the past 24 hours: outpatient IM, outpatient ENT  Tests ORDERED & REVIEWED in the past 24 hours:  - See lab/imaging results included in the data section of the note      Data     I have personally reviewed the following data over the past 24 hrs:    21.0 (H)  \   13.0   / 127 (L)     135 101 11.7 /  177 (H)   3.2 (L) 17 (L) 0.58 \     TSH: 2.22 T4: N/A A1C: N/A     Procal: N/A CRP: N/A Lactic Acid: 1.3         Imaging results reviewed over the past 24 hrs:   Recent Results (from the past 24 hour(s))   Chest XR,  PA & LAT    Narrative    EXAM: XR CHEST 2 VIEWS  LOCATION: [facility]  DATE: 10/25/2023    INDICATION: cough, asthma  COMPARISON: None.    FINDINGS: The heart size is normal. There is mild left basilar atelectasis or scarring. The lungs are otherwise clear. There is no pneumothorax or pleural effusion.      Impression    IMPRESSION: Mild left basilar infiltrate or scarring.

## 2023-10-27 ENCOUNTER — APPOINTMENT (OUTPATIENT)
Dept: ULTRASOUND IMAGING | Facility: HOSPITAL | Age: 33
DRG: 871 | End: 2023-10-27
Attending: STUDENT IN AN ORGANIZED HEALTH CARE EDUCATION/TRAINING PROGRAM
Payer: COMMERCIAL

## 2023-10-27 LAB
ALBUMIN SERPL BCG-MCNC: 3.4 G/DL (ref 3.5–5.2)
ANION GAP SERPL CALCULATED.3IONS-SCNC: 12 MMOL/L (ref 7–15)
BUN SERPL-MCNC: 9.5 MG/DL (ref 6–20)
C PNEUM DNA SPEC QL NAA+PROBE: NOT DETECTED
CALCIUM SERPL-MCNC: 7.5 MG/DL (ref 8.6–10)
CHLORIDE SERPL-SCNC: 110 MMOL/L (ref 98–107)
CREAT SERPL-MCNC: 0.44 MG/DL (ref 0.51–0.95)
DEPRECATED HCO3 PLAS-SCNC: 17 MMOL/L (ref 22–29)
EGFRCR SERPLBLD CKD-EPI 2021: >90 ML/MIN/1.73M2
ERYTHROCYTE [DISTWIDTH] IN BLOOD BY AUTOMATED COUNT: 14.5 % (ref 10–15)
FLUAV H1 2009 PAND RNA SPEC QL NAA+PROBE: NOT DETECTED
FLUAV H1 RNA SPEC QL NAA+PROBE: NOT DETECTED
FLUAV H3 RNA SPEC QL NAA+PROBE: NOT DETECTED
FLUAV RNA SPEC QL NAA+PROBE: NOT DETECTED
FLUBV RNA SPEC QL NAA+PROBE: NOT DETECTED
FOLATE SERPL-MCNC: 5.1 NG/ML (ref 4.6–34.8)
GLUCOSE BLDC GLUCOMTR-MCNC: 114 MG/DL (ref 70–99)
GLUCOSE BLDC GLUCOMTR-MCNC: 123 MG/DL (ref 70–99)
GLUCOSE BLDC GLUCOMTR-MCNC: 211 MG/DL (ref 70–99)
GLUCOSE BLDC GLUCOMTR-MCNC: 229 MG/DL (ref 70–99)
GLUCOSE SERPL-MCNC: 136 MG/DL (ref 70–99)
HADV DNA SPEC QL NAA+PROBE: NOT DETECTED
HCOV PNL SPEC NAA+PROBE: NOT DETECTED
HCT VFR BLD AUTO: 33.9 % (ref 35–47)
HGB BLD-MCNC: 10.4 G/DL (ref 11.7–15.7)
HMPV RNA SPEC QL NAA+PROBE: NOT DETECTED
HPIV1 RNA SPEC QL NAA+PROBE: NOT DETECTED
HPIV2 RNA SPEC QL NAA+PROBE: NOT DETECTED
HPIV3 RNA SPEC QL NAA+PROBE: NOT DETECTED
HPIV4 RNA SPEC QL NAA+PROBE: NOT DETECTED
L PNEUMO1 AG UR QL IA: NEGATIVE
M PNEUMO DNA SPEC QL NAA+PROBE: NOT DETECTED
MAGNESIUM SERPL-MCNC: 2.3 MG/DL (ref 1.7–2.3)
MCH RBC QN AUTO: 24.3 PG (ref 26.5–33)
MCHC RBC AUTO-ENTMCNC: 30.7 G/DL (ref 31.5–36.5)
MCV RBC AUTO: 79 FL (ref 78–100)
PHOSPHATE SERPL-MCNC: 2.2 MG/DL (ref 2.5–4.5)
PLATELET # BLD AUTO: 135 10E3/UL (ref 150–450)
POTASSIUM SERPL-SCNC: 3.7 MMOL/L (ref 3.4–5.3)
RBC # BLD AUTO: 4.28 10E6/UL (ref 3.8–5.2)
RSV RNA SPEC QL NAA+PROBE: NOT DETECTED
RSV RNA SPEC QL NAA+PROBE: NOT DETECTED
RV+EV RNA SPEC QL NAA+PROBE: DETECTED
S PNEUM AG SPEC QL: NEGATIVE
SODIUM SERPL-SCNC: 139 MMOL/L (ref 135–145)
VIT B12 SERPL-MCNC: 543 PG/ML (ref 232–1245)
WBC # BLD AUTO: 20.2 10E3/UL (ref 4–11)

## 2023-10-27 PROCEDURE — 87581 M.PNEUMON DNA AMP PROBE: CPT | Performed by: STUDENT IN AN ORGANIZED HEALTH CARE EDUCATION/TRAINING PROGRAM

## 2023-10-27 PROCEDURE — 250N000013 HC RX MED GY IP 250 OP 250 PS 637: Performed by: FAMILY MEDICINE

## 2023-10-27 PROCEDURE — 250N000009 HC RX 250: Performed by: STUDENT IN AN ORGANIZED HEALTH CARE EDUCATION/TRAINING PROGRAM

## 2023-10-27 PROCEDURE — 258N000003 HC RX IP 258 OP 636: Performed by: STUDENT IN AN ORGANIZED HEALTH CARE EDUCATION/TRAINING PROGRAM

## 2023-10-27 PROCEDURE — 80069 RENAL FUNCTION PANEL: CPT | Performed by: STUDENT IN AN ORGANIZED HEALTH CARE EDUCATION/TRAINING PROGRAM

## 2023-10-27 PROCEDURE — 250N000012 HC RX MED GY IP 250 OP 636 PS 637: Performed by: STUDENT IN AN ORGANIZED HEALTH CARE EDUCATION/TRAINING PROGRAM

## 2023-10-27 PROCEDURE — 83735 ASSAY OF MAGNESIUM: CPT | Performed by: STUDENT IN AN ORGANIZED HEALTH CARE EDUCATION/TRAINING PROGRAM

## 2023-10-27 PROCEDURE — 36415 COLL VENOUS BLD VENIPUNCTURE: CPT | Performed by: STUDENT IN AN ORGANIZED HEALTH CARE EDUCATION/TRAINING PROGRAM

## 2023-10-27 PROCEDURE — 76882 US LMTD JT/FCL EVL NVASC XTR: CPT | Mod: LT

## 2023-10-27 PROCEDURE — 85027 COMPLETE CBC AUTOMATED: CPT | Performed by: STUDENT IN AN ORGANIZED HEALTH CARE EDUCATION/TRAINING PROGRAM

## 2023-10-27 PROCEDURE — 99232 SBSQ HOSP IP/OBS MODERATE 35: CPT | Performed by: STUDENT IN AN ORGANIZED HEALTH CARE EDUCATION/TRAINING PROGRAM

## 2023-10-27 PROCEDURE — 250N000011 HC RX IP 250 OP 636: Mod: JZ | Performed by: STUDENT IN AN ORGANIZED HEALTH CARE EDUCATION/TRAINING PROGRAM

## 2023-10-27 PROCEDURE — 84100 ASSAY OF PHOSPHORUS: CPT | Performed by: STUDENT IN AN ORGANIZED HEALTH CARE EDUCATION/TRAINING PROGRAM

## 2023-10-27 PROCEDURE — 82746 ASSAY OF FOLIC ACID SERUM: CPT | Performed by: STUDENT IN AN ORGANIZED HEALTH CARE EDUCATION/TRAINING PROGRAM

## 2023-10-27 PROCEDURE — 120N000001 HC R&B MED SURG/OB

## 2023-10-27 PROCEDURE — 250N000011 HC RX IP 250 OP 636: Performed by: STUDENT IN AN ORGANIZED HEALTH CARE EDUCATION/TRAINING PROGRAM

## 2023-10-27 PROCEDURE — 99222 1ST HOSP IP/OBS MODERATE 55: CPT | Performed by: INTERNAL MEDICINE

## 2023-10-27 PROCEDURE — 250N000013 HC RX MED GY IP 250 OP 250 PS 637: Performed by: STUDENT IN AN ORGANIZED HEALTH CARE EDUCATION/TRAINING PROGRAM

## 2023-10-27 RX ORDER — BENZONATATE 100 MG/1
100 CAPSULE ORAL 3 TIMES DAILY PRN
Status: DISCONTINUED | OUTPATIENT
Start: 2023-10-27 | End: 2023-10-28 | Stop reason: HOSPADM

## 2023-10-27 RX ORDER — IPRATROPIUM BROMIDE AND ALBUTEROL SULFATE 2.5; .5 MG/3ML; MG/3ML
3 SOLUTION RESPIRATORY (INHALATION) 3 TIMES DAILY
Status: DISCONTINUED | OUTPATIENT
Start: 2023-10-27 | End: 2023-10-27

## 2023-10-27 RX ORDER — FERROUS SULFATE 325(65) MG
325 TABLET ORAL DAILY
Status: DISCONTINUED | OUTPATIENT
Start: 2023-10-27 | End: 2023-10-28 | Stop reason: HOSPADM

## 2023-10-27 RX ADMIN — FERROUS SULFATE TAB 325 MG (65 MG ELEMENTAL FE) 325 MG: 325 (65 FE) TAB at 08:31

## 2023-10-27 RX ADMIN — PREDNISONE 40 MG: 20 TABLET ORAL at 08:31

## 2023-10-27 RX ADMIN — SODIUM PHOSPHATE, MONOBASIC, MONOHYDRATE AND SODIUM PHOSPHATE, DIBASIC, ANHYDROUS 30 MMOL: 142; 276 INJECTION, SOLUTION INTRAVENOUS at 14:27

## 2023-10-27 RX ADMIN — SODIUM CHLORIDE: 9 INJECTION, SOLUTION INTRAVENOUS at 12:27

## 2023-10-27 RX ADMIN — AZITHROMYCIN MONOHYDRATE 500 MG: 500 INJECTION, POWDER, LYOPHILIZED, FOR SOLUTION INTRAVENOUS at 20:06

## 2023-10-27 RX ADMIN — CEFTRIAXONE SODIUM 1 G: 1 INJECTION, POWDER, FOR SOLUTION INTRAMUSCULAR; INTRAVENOUS at 22:18

## 2023-10-27 RX ADMIN — BENZONATATE 100 MG: 100 CAPSULE ORAL at 06:49

## 2023-10-27 RX ADMIN — ENOXAPARIN SODIUM 40 MG: 40 INJECTION SUBCUTANEOUS at 08:32

## 2023-10-27 RX ADMIN — FLUTICASONE FUROATE AND VILANTEROL TRIFENATATE 1 PUFF: 200; 25 POWDER RESPIRATORY (INHALATION) at 08:32

## 2023-10-27 ASSESSMENT — ACTIVITIES OF DAILY LIVING (ADL)
ADLS_ACUITY_SCORE: 18

## 2023-10-27 NOTE — PROGRESS NOTES
Care Management Follow Up    Length of Stay (days): 1    Expected Discharge Date: 10/28/2023     Concerns to be Addressed:   discharge planning     Patient plan of care discussed at interdisciplinary rounds: Yes    Anticipated Discharge Disposition: Home     Anticipated Discharge Services:    Education Provided on the Discharge Plan:  Per Care Team    Patient/Family in Agreement with the Plan:  Yes    Referrals Placed by CM/SW:    Private pay costs discussed: Not applicable    Additional Information:  Chart reviewed.     CM updates:  Per provider update pt has pneumonia wants to consult pulm, pulm consulted.     U/S of upper extrem non vascular left ordered.     Social Hx:  Pt ind at baseline. No svcs prior. Anticipate home at discharge.       Cm will continue to follow plan of care, review recommendations, and assist with any discharge needs anticipated.     Khadijah Echeverria RN

## 2023-10-27 NOTE — PLAN OF CARE
Patient tolerating diet. Denies pain. IVF and IV abx infusing per orders. Independent in room. Urine sample sent. Refused insulin at hs.     Kimberly Beach RN

## 2023-10-27 NOTE — CONSULTS
"[facility] Pulmonary/Critical Care Consult Team Note    Claire Mora,  1990, MRN [id number]  Admitting Dx: Sinus tachycardia [R00.0]  Asthma exacerbation [J45.901]  Sepsis (H) [A41.9]  Pneumonia of left lower lobe due to infectious organism [J18.9]  Date / Time of Admission:  10/25/2023  8:52 PM    Recent Events:  Intake/Output last 3 shifts:  I/O last 3 completed shifts:  In: 1427 [P.O.:420; I.V.:1007]  Out: -     On room air  She says her breathing is much better  She would like to go home to take care of her small kids  Her last asthma attack was over a year ago  She thinks maybe the weather triggered it   She was coughing up yellow sputum before coming to the hospital    Assessment/Plan: Claire Mora is a 33 year old female with PMHx of Asthma who presents with cough, wheezing, and CT findings of bronchopneumonia and acute bronchitis who is feeling much better now.    Asthma Exacerbation  - on prednisone, would treat her for 5 more days of 40mg and then stop  - holding her home inhaled steroids while on systemic prednisone  - agree with dunebs tid   - She gets her Advair from her PCP and she probably does not need to see pulmonary unless this becomes more frequent    bronchopneumonia and acute bronchitis  - agree with CAP Abx, would do 5-7 days total    Cultures so far  Viral panel negative for COVID, flu, respiratory syncytial viru   Legionella and Streptococcus pneumonia antigen negative  full viral panel pending    Medical Care Time excluding procedures and family discussions greater than: 45 Minutes    Risk Factors Present on Admission:  Clinically Significant Risk Factors        # Hypokalemia: Lowest K = 3.2 mmol/L in last 2 days, will replace as needed         # Thrombocytopenia: Lowest platelets = 127 in last 2 days, will monitor for bleeding          # Obesity: Estimated body mass index is 32.18 kg/m  as calculated from the following:    Height as of this encounter: 1.6 m (5' 3\").    Weight as " "of this encounter: 82.4 kg (181 lb 10.5 oz)., PRESENT ON ADMISSION     # Asthma: noted on problem list               Yennifer Warren,   Pulmonary and Critical Care Attending  pgr [phone]    No Known Allergies    Meds: See MAR    Physical Exam:  /56 (BP Location: Left arm)   Pulse 71   Temp 97.8  F (36.6  C) (Oral)   Resp 20   Ht 1.6 m (5' 3\")   Wt 82.4 kg (181 lb 10.5 oz)   LMP 09/30/2023 (Approximate)   SpO2 96%   BMI 32.18 kg/m    Intake/Output this shift:  No intake/output data recorded.  GEN: sitting up in bed, NAD  HEENT: MMM  CVS: regular rhythm, no murmurs  RESP: CTA BL, no wheezing, no rhonchi  ABD: Soft, No abdominal pain with palpation, no guarding, no rigidity  EXT: Warm, well perfused, no edema  NEURO: moving all extremities, nonfocal  PSYCH: pleasant    Pertinent Labs: Latest lab results in EHR personally reviewed.   CMP  Recent Labs   Lab 10/27/23  0832 10/27/23  0659 10/26/23  2020 10/26/23  0700 10/25/23  2300   NA  --  139  --  136 135   POTASSIUM  --  3.7  --  3.6 3.2*   CHLORIDE  --  110*  --  106 101   CO2  --  17*  --  17* 17*   ANIONGAP  --  12  --  13 17*   * 136* 204* 223* 177*   BUN  --  9.5  --  7.7 11.7   CR  --  0.44*  --  0.46* 0.58   GFRESTIMATED  --  >90  --  >90 >90   MITZI  --  7.5*  --  8.0* 9.0   MAG  --  2.3  --   --  1.7   PHOS  --  2.2*  --   --   --      CBC  Recent Labs   Lab 10/27/23  0659 10/26/23  0700 10/25/23  2300   WBC 20.2* 21.3* 21.0*   RBC 4.28 4.77 5.30*   HGB 10.4* 11.7 13.0   HCT 33.9* 37.1 40.9   MCV 79 78 77*   MCH 24.3* 24.5* 24.5*   MCHC 30.7* 31.5 31.8   RDW 14.5 14.3 14.5   * 127* 127*     INRNo lab results found in last 7 days.  Arterial Blood GasNo lab results found in last 7 days.    Cultures: personally reviewed.     Imaging: personally reviewed.   Results for orders placed or performed during the hospital encounter of 10/25/23   Chest XR,  PA & LAT    Narrative    EXAM: XR CHEST 2 VIEWS  LOCATION: [facility]  DATE: 10/25/2023    INDICATION: cough, asthma  COMPARISON: None.    FINDINGS: The heart size is normal. There is mild left basilar atelectasis or scarring. The lungs are otherwise clear. There is no pneumothorax or pleural effusion.      Impression    IMPRESSION: Mild left basilar infiltrate or scarring.   CT Chest Pulmonary Embolism w Contrast    Narrative    EXAM: CT CHEST PULMONARY EMBOLISM W CONTRAST  LOCATION: [facility]  DATE: 10/26/2023    INDICATION: sob, tachycardia    COMPARISON: Chest x-ray 10/25/2023  TECHNIQUE: CT chest pulmonary angiogram during arterial phase injection of IV contrast. Multiplanar reformats and MIP reconstructions were performed. Dose reduction techniques were used.   CONTRAST: isovue 370 90ml    FINDINGS:  ANGIOGRAM CHEST: Excellent opacification of pulmonary arteries and branches. No evidence of pulmonary emboli. Aorta and branches are normal.      LUNGS AND PLEURA: Patchy areas of consolidation are noted throughout the right lower lobe especially in the superior segment. There are ill-defined inflammatory nodules surrounding the larger areas of consolidation. Marked areas of peribronchial   thickening noted throughout with areas of retained secretions as well. Mosaic appearance to the lung. No effusion.    MEDIASTINUM/AXILLAE: Mild right hilar adenopathy measuring up to a centimeter in short axis. There is one asymmetrically enlarged left axillary node measuring 1.3 x 1 cm (axial image 80, coronal image 42).    CORONARY ARTERY CALCIFICATION: None.    UPPER ABDOMEN: Stomach is distended with food.    MUSCULOSKELETAL: Unremarkable.      Impression    IMPRESSION:  1.  Patient has patchy areas of bronchopneumonia involving the right lower lobe.  2.  There is underlying pan-bronchitis with peribronchial thickening, areas of retained secretions and  a mosaic appearance to the lung indicative of small airway disease.  3.  Mild reactive right hilar  adenopathy.  4.  No parapneumonic effusion.  5.  There is one mild, asymmetrically enlarged left axillary node of questionable significance. Query recent vaccination? Consider nonemergent follow-up ultrasound in 4-6 weeks.  6.  No evidence of pulmonary emboli.    NOTE: ABNORMAL REPORT    1.  THE DICTATION ABOVE DESCRIBES AN ABNORMALITY FOR WHICH FOLLOW-UP IS NEEDED.        Patient Active Problem List   Diagnosis    Sinus tachycardia    Asthma exacerbation    Sepsis (H)    Pneumonia of left lower lobe due to infectious organism         Surgical History  She  has no past surgical history on file.    Family History  Reviewed, and family history is not on file.    Social History  Reviewed, and she  reports that she has never smoked. She has never used smokeless tobacco.    Allergies  No Known Allergies           Yennifer Warren, DO  Pulmonary and Critical Care Attending  pgr [phone]    Securely message with the [employer] (learn more here)

## 2023-10-27 NOTE — PLAN OF CARE
Problem: Adult Inpatient Plan of Care  Goal: Absence of Hospital-Acquired Illness or Injury  Intervention: Identify and Manage Fall Risk  Recent Flowsheet Documentation  Taken 10/26/2023 2345 by Leonard Cheung RN  Safety Promotion/Fall Prevention:   nonskid shoes/slippers when out of bed   patient and family education  Intervention: Prevent Skin Injury  Recent Flowsheet Documentation  Taken 10/26/2023 2345 by Leonard Cheung RN  Body Position: position changed independently  Intervention: Prevent and Manage VTE (Venous Thromboembolism) Risk  Recent Flowsheet Documentation  Taken 10/26/2023 2345 by Leonard Cheung RN  VTE Prevention/Management: (pt on  lovenox.) SCDs (sequential compression devices) off   Goal Outcome Evaluation:        Pt A/Ox4, room air, denied pain, NSR on tele. Independent in room, denied SOB. Respiratory culture obtained in this shift.

## 2023-10-27 NOTE — PLAN OF CARE
Problem: Infection  Goal: Absence of Infection Signs and Symptoms  Outcome: Progressing     Problem: Gas Exchange Impaired  Goal: Optimal Gas Exchange  Outcome: Progressing   Goal Outcome Evaluation: pt alert and oriented, denied pain, on room air , NSR on tele, had a shower this morning, independent with activity

## 2023-10-27 NOTE — PLAN OF CARE
Problem: Infection  Goal: Absence of Infection Signs and Symptoms  10/27/2023 1457 by Maylin Benitez RN  Outcome: Progressing     Problem: Gas Exchange Impaired  Goal: Optimal Gas Exchange  10/27/2023 1457 by Maylin Benitez RN  Outcome: Progressing   Goal Outcome Evaluation: pt alert and oriented, denied pain, on room air , NSR on tele, had a shower this morning, independent with activity. Normal saline infusing at 75 ml /hr.

## 2023-10-27 NOTE — PROGRESS NOTES
[facility]    Medicine Progress Note - Hospitalist Service    Date of Admission:  10/25/2023    Assessment & Plan   Assessment:  Claire Mora is a 33F presenting with general malaise, sore throat, urinary frequency; pmhx includes asthma, Hep B carrier; admitted for suspected urosepsis.    Problem list and plan:  Asthma exacerbation  Right lower lobe bronchopneumonia  Suspected sepsis possibly in the setting of pneumonia  Tachycardia possibly in the setting of asthma/pneumonia resolved  High anion gap metabolic acidosis resolving  Leukocytosis most likely in setting of pneumonia resolving  Breo (replaces advair inpatient) 1 puff every day  Receiving IV ceftriaxone and azithromycin  Continue with bronchodilators and DuoNeb  Continue with oral steroids  Receiving fluid resuscitation  Chest x-ray shows left basilar infiltrate, ordered CT scan of the chest both to rule out PE/pneumonia  Monitored fever and WBC curve  CT scan of the chest showed Patient has patchy areas of bronchopneumonia involving the right lower lobe. There is underlying pan-bronchitis with peribronchial thickening, areas of retained secretions and  a mosaic appearance to the lung indicative of small airway disease. Mild reactive right hilar adenopathy. No parapneumonic effusion. No evidence of pulmonary emboli.  As CT scan showed pneumonia pulmonology was consulted who recommended to continue prednisone for 5 more days along with antibiotics which pulmonology recommended for 5 to 7 days, please review full note for details, discussed with pulmonology and as per pulmonology if patient feeling better can be discharged in a.m. 10/28/2023.  Blood cultures currently showing no growth, Streptococcus group a PCR throat not detected, viral panel negative for COVID, flu, respiratory syncytial virus, full viral panel pending, negative Legionella and Streptococcus pneumoniae urine antigen.    Abnormal axillary lymph node on imaging  CT  "showed there is one mild, asymmetrically enlarged left axillary node of questionable significance. Query recent vaccination?   Discussed with patient and patient stated she did not have any vaccination recently.  Ordered ultrasound of the upper extremity to follow-up on the axillary lymph node  Would also advise follow-up ultrasound in 4-6 weeks with PCP as outpatient.    UTI ruled out  Received fluids  Urinalysis has low suspicion for infection so urine culture was not indicated  Received ceftriaxone    Electrolyte abnormality/hypokalemia/hypomagnesemia/hypophosphatemia  Repleted electrolytes    Microcytic anemia and thrombocytopenia  Iron deficiency anemia  Dilutional anemia  Monitor CBC  Iron panel showed ferritin levels 70, suspecting mild underlying iron deficiency so started on p.o. iron  Platelet currently appears to be stable and improving  Hemoglobin trended down from 13-10.4 with no visible signs of blood loss, suspecting some degree from blood draws and also from dilution in the setting of fluid resuscitation and initial hemoconcentration.    Hyperglycemia secondary to steroid use  Prediabetes  Monitor glucose levels  Hemoglobin A1c 6.4  Started on sliding scale        Diet: Combination Diet Regular Diet Adult    DVT Prophylaxis: Enoxaparin (Lovenox) SQ  Sainz Catheter: Not present  Lines: None     Cardiac Monitoring: ACTIVE order. Indication: Tachyarrhythmias, acute (48 hours)  Code Status: Full Code      Clinically Significant Risk Factors        # Hypokalemia: Lowest K = 3.2 mmol/L in last 2 days, will replace as needed         # Thrombocytopenia: Lowest platelets = 127 in last 2 days, will monitor for bleeding          # Obesity: Estimated body mass index is 32.18 kg/m  as calculated from the following:    Height as of this encounter: 1.6 m (5' 3\").    Weight as of this encounter: 82.4 kg (181 lb 10.5 oz)., PRESENT ON ADMISSION     # Asthma: noted on problem list        Disposition Plan     Expected " Discharge Date: 10/28/2023                    Saad J. Gondal, MD  Hospitalist Service  [facility]  Securely message with [employer] (more info)  Text page via [employer] Paging/Directory   ______________________________________________________________________    Interval History   Patient was seen and examined at bedside, was feeling a little better, discussed plan with the patient's  as well over the phone along with pulmonology, will continue with IV antibiotics for today.  CT scan of the chest reviewed.    Physical Exam   Vital Signs: Temp: 98.3  F (36.8  C) Temp src: Oral BP: 99/48 Pulse: 77   Resp: 20 SpO2: 95 % O2 Device: None (Room air)    Weight: 181 lbs 10.54 oz    GENERAL: Patient was seen and examined at bedside with no acute distress  HENT:  Head is normocephalic, atraumatic.   EYES:  Eyes have anicteric sclerae without conjunctival injection   LUNGS: Bilateral air entry, coarse breath sounds bilaterally  CARDIOVASCULAR:  Regular rate and rhythm with no murmurs, gallops or rubs.  ABDOMEN:  Normal bowel sounds. Soft; nontender   EXT: no edema    SKIN:  No acute rashes.   NEUROLOGIC:  Grossly nonfocal.      Medical Decision Making       45 MINUTES SPENT BY ME on the date of service doing chart review, history, exam, documentation & further activities per the note.      Data     I have personally reviewed the following data over the past 24 hrs:    20.2 (H)  \   10.4 (L)   / 135 (L)     139 110 (H) 9.5 /  123 (H)   3.7 17 (L) 0.44 (L) \     TSH: N/A T4: N/A A1C: N/A     Ferritin:  N/A % Retic:  N/A LDH:  N/A       Imaging results reviewed over the past 24 hrs:   Recent Results (from the past 24 hour(s))   CT Chest Pulmonary Embolism w Contrast    Narrative    EXAM: CT CHEST PULMONARY EMBOLISM W CONTRAST  LOCATION: [facility]  DATE: 10/26/2023    INDICATION: sob, tachycardia    COMPARISON: Chest x-ray 10/25/2023  TECHNIQUE: CT chest pulmonary angiogram during  arterial phase injection of IV contrast. Multiplanar reformats and MIP reconstructions were performed. Dose reduction techniques were used.   CONTRAST: isovue 370 90ml    FINDINGS:  ANGIOGRAM CHEST: Excellent opacification of pulmonary arteries and branches. No evidence of pulmonary emboli. Aorta and branches are normal.      LUNGS AND PLEURA: Patchy areas of consolidation are noted throughout the right lower lobe especially in the superior segment. There are ill-defined inflammatory nodules surrounding the larger areas of consolidation. Marked areas of peribronchial   thickening noted throughout with areas of retained secretions as well. Mosaic appearance to the lung. No effusion.    MEDIASTINUM/AXILLAE: Mild right hilar adenopathy measuring up to a centimeter in short axis. There is one asymmetrically enlarged left axillary node measuring 1.3 x 1 cm (axial image 80, coronal image 42).    CORONARY ARTERY CALCIFICATION: None.    UPPER ABDOMEN: Stomach is distended with food.    MUSCULOSKELETAL: Unremarkable.      Impression    IMPRESSION:  1.  Patient has patchy areas of bronchopneumonia involving the right lower lobe.  2.  There is underlying pan-bronchitis with peribronchial thickening, areas of retained secretions and  a mosaic appearance to the lung indicative of small airway disease.  3.  Mild reactive right hilar adenopathy.  4.  No parapneumonic effusion.  5.  There is one mild, asymmetrically enlarged left axillary node of questionable significance. Query recent vaccination? Consider nonemergent follow-up ultrasound in 4-6 weeks.  6.  No evidence of pulmonary emboli.    NOTE: ABNORMAL REPORT    1.  THE DICTATION ABOVE DESCRIBES AN ABNORMALITY FOR WHICH FOLLOW-UP IS NEEDED.

## 2023-10-28 VITALS
BODY MASS INDEX: 32.19 KG/M2 | HEART RATE: 73 BPM | SYSTOLIC BLOOD PRESSURE: 107 MMHG | HEIGHT: 63 IN | WEIGHT: 181.66 LBS | RESPIRATION RATE: 18 BRPM | DIASTOLIC BLOOD PRESSURE: 54 MMHG | TEMPERATURE: 97.9 F | OXYGEN SATURATION: 97 %

## 2023-10-28 LAB
ATRIAL RATE - MUSE: 132 BPM
DIASTOLIC BLOOD PRESSURE - MUSE: NORMAL MMHG
GLUCOSE BLDC GLUCOMTR-MCNC: 91 MG/DL (ref 70–99)
INTERPRETATION ECG - MUSE: NORMAL
P AXIS - MUSE: 70 DEGREES
PR INTERVAL - MUSE: 130 MS
QRS DURATION - MUSE: 80 MS
QT - MUSE: 314 MS
QTC - MUSE: 465 MS
R AXIS - MUSE: 54 DEGREES
SYSTOLIC BLOOD PRESSURE - MUSE: NORMAL MMHG
T AXIS - MUSE: 44 DEGREES
VENTRICULAR RATE- MUSE: 132 BPM

## 2023-10-28 PROCEDURE — 250N000013 HC RX MED GY IP 250 OP 250 PS 637: Performed by: STUDENT IN AN ORGANIZED HEALTH CARE EDUCATION/TRAINING PROGRAM

## 2023-10-28 PROCEDURE — 99239 HOSP IP/OBS DSCHRG MGMT >30: CPT | Performed by: INTERNAL MEDICINE

## 2023-10-28 PROCEDURE — 99233 SBSQ HOSP IP/OBS HIGH 50: CPT | Mod: GC | Performed by: INTERNAL MEDICINE

## 2023-10-28 PROCEDURE — 250N000012 HC RX MED GY IP 250 OP 636 PS 637: Performed by: STUDENT IN AN ORGANIZED HEALTH CARE EDUCATION/TRAINING PROGRAM

## 2023-10-28 PROCEDURE — 250N000011 HC RX IP 250 OP 636: Mod: JZ | Performed by: STUDENT IN AN ORGANIZED HEALTH CARE EDUCATION/TRAINING PROGRAM

## 2023-10-28 PROCEDURE — 258N000003 HC RX IP 258 OP 636: Performed by: STUDENT IN AN ORGANIZED HEALTH CARE EDUCATION/TRAINING PROGRAM

## 2023-10-28 RX ORDER — SODIUM CHLORIDE FOR INHALATION 3 %
3 VIAL, NEBULIZER (ML) INHALATION ONCE
Status: DISCONTINUED | OUTPATIENT
Start: 2023-10-28 | End: 2023-10-28 | Stop reason: HOSPADM

## 2023-10-28 RX ORDER — PREDNISONE 20 MG/1
40 TABLET ORAL DAILY
Qty: 8 TABLET | Refills: 0 | Status: SHIPPED | OUTPATIENT
Start: 2023-10-29 | End: 2023-11-02

## 2023-10-28 RX ORDER — FERROUS SULFATE 325(65) MG
325 TABLET ORAL DAILY
Qty: 30 TABLET | Refills: 0 | Status: SHIPPED | OUTPATIENT
Start: 2023-10-29

## 2023-10-28 RX ADMIN — FLUTICASONE FUROATE AND VILANTEROL TRIFENATATE 1 PUFF: 200; 25 POWDER RESPIRATORY (INHALATION) at 09:37

## 2023-10-28 RX ADMIN — PREDNISONE 40 MG: 20 TABLET ORAL at 09:37

## 2023-10-28 RX ADMIN — FERROUS SULFATE TAB 325 MG (65 MG ELEMENTAL FE) 325 MG: 325 (65 FE) TAB at 09:37

## 2023-10-28 RX ADMIN — ACETAMINOPHEN 650 MG: 325 TABLET ORAL at 00:19

## 2023-10-28 RX ADMIN — SODIUM CHLORIDE: 9 INJECTION, SOLUTION INTRAVENOUS at 03:48

## 2023-10-28 RX ADMIN — ENOXAPARIN SODIUM 40 MG: 40 INJECTION SUBCUTANEOUS at 09:37

## 2023-10-28 ASSESSMENT — ACTIVITIES OF DAILY LIVING (ADL)
ADLS_ACUITY_SCORE: 18

## 2023-10-28 NOTE — PLAN OF CARE
Patient tolerating diet. Denies pain. IVF and IV abx infusing per orders. Independent in room. Family visited. Positive for HRV, MD aware.    Kimberly Beach RN

## 2023-10-28 NOTE — PLAN OF CARE
Problem: Adult Inpatient Plan of Care  Goal: Optimal Comfort and Wellbeing  Outcome: Progressing  Intervention: Monitor Pain and Promote Comfort  Recent Flowsheet Documentation  Taken 10/28/2023 0019 by Maggie Mcclellan RN  Pain Management Interventions: medication (see MAR)   Goal Outcome Evaluation:       Alert/oriented, tylenol given for minor abdo pain with relief. On tele, NSR. Independent in room, call light within reach.a

## 2023-10-28 NOTE — PLAN OF CARE
Goal Outcome Evaluation:         Problem: Adult Inpatient Plan of Care  Goal: Optimal Comfort and Wellbeing  Outcome: Adequate for Care Transition     Problem: Adult Inpatient Plan of Care  Goal: Readiness for Transition of Care  Outcome: Adequate for Care Transition     Droplet precautions maintained. Pt could not produce any sputum for respiratory culture. Pt denies pain. Pt tolerated continuous normal saline infusion well. Pt to discharge home via personal vehicle driven by . No other concerns noted.   Michael Jaquez RN  10/28/2023  1:11 PM

## 2023-10-28 NOTE — PROGRESS NOTES
PULMONARY / CRITICAL CARE PROGRESS NOTE    Date / Time of Admission:  10/25/2023  8:52 PM    Assessment:     Claire Mora is a 33 year old female with history of asthma, GERD, thrombocytopenia   Presents to ED on 10/25 for evaluation of headaches, chills, shortness of breath and sore throat. Patient was hemodynamically stable, afebrile. Audible wheezes on exam.   Negative influenza and COVID19 PCR. Strep group A PCR was negative.   Labs showed elevated WBC, metabolic acidosis.   Chest CT scan showed no pulmonary embolism, right lower lobe infiltrate, peribronchial wall thickening, reactive hilar adenopathy.  Admitted with diagnosis of pneumonia and asthma exacerbation     Asthma moderate persistent with acute exacerbation   Pneumonia     Advance Directives:  Full code    Plan:   Titrate FiO2 to keep SpO2 > 90%  Bronchodilators as needed  Steroid for 5 days   Continue ICS/LABA  Narrow Abx to augmentin and complete one week   Patient can be discharged home     Pulmonary service will sign off  Please contact me if you have any questions.    Jim Bolton  Pulmonary / Critical Care  10/28/2023  10:12 AM      Subjective:   HPI:  Claire Mora is a 33 year old female with history of asthma, GERD, thrombocytopenia   Presents to ED on 10/25 for evaluation of headaches, chills, shortness of breath and sore throat. Patient was hemodynamically stable, afebrile. Audible wheezes on exam.   Negative influenza and COVID19 PCR. Strep group A PCR was negative.   Labs showed elevated WBC, metabolic acidosis.   Chest CT scan showed no pulmonary embolism, right lower lobe infiltrate, peribronchial wall thickening, reactive hilar adenopathy.  Admitted with diagnosis of pneumonia and asthma exacerbation     Events overnight   - Doing better, no shortness of breath  - Decrease appetite  - Wants to go home     Allergies: Patient has no known allergies.     MEDS:  Current Facility-Administered Medications   Medication     "acetaminophen (TYLENOL) tablet 650 mg    Or    acetaminophen (TYLENOL) Suppository 650 mg    albuterol (PROVENTIL HFA/VENTOLIN HFA) inhaler    azithromycin (ZITHROMAX) 500 mg in sodium chloride 0.9 % 250 mL intermittent infusion    benzonatate (TESSALON) capsule 100 mg    cefTRIAXone (ROCEPHIN) 1 g vial to attach to  mL bag for ADULTS or NS 50 mL bag for PEDS    glucose gel 15-30 g    Or    dextrose 50 % injection 25-50 mL    Or    glucagon injection 1 mg    enoxaparin ANTICOAGULANT (LOVENOX) injection 40 mg    ferrous sulfate (FEROSUL) tablet 325 mg    fluticasone-vilanterol (BREO ELLIPTA) 200-25 MCG/ACT inhaler 1 puff    insulin aspart (NovoLOG) injection (RAPID ACTING)    insulin aspart (NovoLOG) injection (RAPID ACTING)    melatonin tablet 1 mg    ondansetron (ZOFRAN ODT) ODT tab 4 mg    Or    ondansetron (ZOFRAN) injection 4 mg    predniSONE (DELTASONE) tablet 40 mg    prochlorperazine (COMPAZINE) injection 10 mg    Or    prochlorperazine (COMPAZINE) tablet 10 mg    Or    prochlorperazine (COMPAZINE) suppository 25 mg    sodium chloride 0.9 % infusion     Objective:   VITALS:  /54 (BP Location: Left arm)   Pulse 73   Temp 97.9  F (36.6  C) (Oral)   Resp 18   Ht 1.6 m (5' 3\")   Wt 82.4 kg (181 lb 10.5 oz)   LMP 09/30/2023 (Approximate)   SpO2 97%   BMI 32.18 kg/m    VENT:  Resp: 18    EXAM:   Gen: awake, alert, no distress  HEENT: pink conjunctiva, moist mucosa, Mallampati II/IV  Neck: no thyromegaly, masses or JVD  Lungs: discrete wheezes both HT  CV: regular, no murmurs or gallops appreciated  Abdomen: soft, NT, BS wnl  Ext: no edema  Neuro: CN II-XII intact, non focal       Data Review:  Recent Labs   Lab 10/28/23  0804 10/27/23  2104 10/27/23  1638 10/27/23  1058 10/27/23  0832 10/27/23  0659   GLC 91 211* 229* 123* 114* 136*      10/27/23 06:59   Sodium 139   Potassium 3.7   Chloride 110 (H)   Carbon Dioxide (CO2) 17 (L)   Urea Nitrogen 9.5   Creatinine 0.44 (L)   GFR Estimate >90 "   Calcium 7.5 (L)   Anion Gap 12   Magnesium 2.3   Phosphorus 2.2 (L)   Albumin 3.4 (L)   Folate 5.1   Glucose 136 (H)      10/27/23 06:59   WBC 20.2 (H)   Hemoglobin 10.4 (L)   Hematocrit 33.9 (L)   Platelet Count 135 (L)   RBC Count 4.28   MCV 79   MCH 24.3 (L)   MCHC 30.7 (L)   RDW 14.5     CT CHEST PULMONARY EMBOLISM W CONTRAST  LOCATION: [facility]  DATE: 10/26/2023  INDICATION: sob, tachycardia    COMPARISON: Chest x-ray 10/25/2023  FINDINGS:  ANGIOGRAM CHEST: Excellent opacification of pulmonary arteries and branches. No evidence of pulmonary emboli. Aorta and branches are normal.    LUNGS AND PLEURA: Patchy areas of consolidation are noted throughout the right lower lobe especially in the superior segment. There are ill-defined inflammatory nodules surrounding the larger areas of consolidation. Marked areas of peribronchial   thickening noted throughout with areas of retained secretions as well. Mosaic appearance to the lung. No effusion.  MEDIASTINUM/AXILLAE: Mild right hilar adenopathy measuring up to a centimeter in short axis. There is one asymmetrically enlarged left axillary node measuring 1.3 x 1 cm (axial image 80, coronal image 42).  CORONARY ARTERY CALCIFICATION: None.  UPPER ABDOMEN: Stomach is distended with food.  MUSCULOSKELETAL: Unremarkable.                                        IMPRESSION:  1.  Patient has patchy areas of bronchopneumonia involving the right lower lobe.  2.  There is underlying pan-bronchitis with peribronchial thickening, areas of retained secretions and  a mosaic appearance to the lung indicative of small airway disease.  3.  Mild reactive right hilar adenopathy.  4.  No parapneumonic effusion.  5.  There is one mild, asymmetrically enlarged left axillary node of questionable significance. Query recent vaccination? Consider nonemergent follow-up ultrasound in 4-6 weeks.  6.  No evidence of pulmonary emboli.    By:  Jim Bolton MD,  10/28/2023  10:12 AM    Primary Care Physician:  Osmel [facility]

## 2023-10-28 NOTE — DISCHARGE SUMMARY
[facility] MEDICINE  DISCHARGE SUMMARY     Primary Care Physician: Osmel [facility]  Admission Date: 10/25/2023   Discharge Provider: LALY Kraft Discharge Date: 10/28/2023   Diet: as below   Code Status: Full Code   Activity: DCACTIVITY: Activity as tolerated        Condition at Discharge: Stable     REASON FOR PRESENTATION(See Admission Note for Details)   Generalized weakness, body aches and sorthroat    PRINCIPAL & ACTIVE DISCHARGE DIAGNOSES     Principal Problem:    Pneumonia of left lower lobe due to infectious organism  Active Problems:    Sinus tachycardia    Asthma exacerbation    Sepsis (H)      PENDING LABS     Unresulted Labs Ordered in the Past 30 Days of this Admission       Date and Time Order Name Status Description    10/26/2023 10:15 PM Blood Culture Peripheral Blood Preliminary     10/26/2023 10:15 PM Blood Culture Peripheral Blood Preliminary     10/26/2023 12:02 AM Blood Culture Peripheral Blood Preliminary               PROCEDURES ( this hospitalization only)          RECOMMENDATIONS TO OUTPATIENT PROVIDER FOR F/U VISIT     Follow-up Appointments     Follow-up and recommended labs and tests       Follow up with primary care provider, [facility] Osmel,   within 7 days for hospital follow- up.    Please have Ultrasound study for enlarged left axillary lymph nodes in   next 4-6 wks through the primary MD.                DISPOSITION     Home    SUMMARY OF HOSPITAL COURSE:      Claire Mora is a 33F presenting with general malaise, sore throat, urinary frequency; pmhx includes asthma, Hep B carrier.     Asthma exacerbation  Right lower lobe bronchopneumonia  Suspected sepsis possibly in the setting of pneumonia  Tachycardia possibly in the setting of asthma/pneumonia resolved  High anion gap metabolic acidosis resolving  Leukocytosis most likely in setting of pneumonia resolving  --Breo (replaces advair inpatient) 1 puff  every day  --Treated with IV ceftriaxone and azithromycin. Completed Azithromycin course. Discharging on few more days of PO Augmentin to complete the course of antibiotics  --Continue PO prednisone for 4 more days and stop  --CT scan of the chest showed Patient has patchy areas of bronchopneumonia involving the right lower lobe.   --Pulm consulted  --Blood cultures currently showing no growth, Streptococcus group a PCR throat not detected, viral panel negative for COVID, flu, respiratory syncytial virus, full viral panel pending, negative Legionella and Streptococcus pneumoniae urine antigen.     Addendum: 10/29/23 1:30PM  I was informed by Dr Lanette hudson (AM swing) to call the micorbiology lab for critical lab. Per micro lab, blood culture from 10/26 is now growing GPC in clusters. Final ID is still pending. Patient was doing well here on IV zithromax and Rocephin. Discharged home yesterday on PO augmentin. Once the sensitivity is back and if this GPC is resistant to augmentin, might need to switch the antibiotics.     Abnormal axillary lymph node on imaging  --CT showed there is one mild, asymmetrically enlarged left axillary node of questionable significance.   --Advised follow-up ultrasound in 4-6 weeks with PCP as outpatient.     UTI ruled out  --Received fluids  --Urinalysis has low suspicion for infection so urine culture was not indicated     Electrolyte abnormality/hypokalemia/hypomagnesemia/hypophosphatemia  --Repleted electrolytes     Microcytic anemia and thrombocytopenia  Iron deficiency anemia  Dilutional anemia  --Monitor CBC  --Iron panel showed ferritin levels 70, suspecting mild underlying iron deficiency so started on p.o. iron  --Platelet currently appears to be stable and improving  --Hemoglobin trended down from 13-10.4 with no visible signs of blood loss, suspecting some degree from blood draws and also from dilution in the setting of fluid resuscitation and initial hemoconcentration.    "  Hyperglycemia secondary to steroid use  Prediabetes  --Monitor glucose levels  --Hemoglobin A1c 6.4  --Started on sliding scale. Patient refused novolog sliding scale per nursing staff.     I saw the patient for the first time today. Patient was feeling better and eager to go home.  was at bedside. He seemed upset about the patient needing to sleep in a recliner for over 24 hours while in ED. I apologized for the inconvenience.    Discharge Medications with Med changes:     Discharge Medication List as of 10/28/2023 12:35 PM        START taking these medications    Details   amoxicillin-clavulanate (AUGMENTIN) 875-125 MG tablet Take 1 tablet by mouth 2 times daily for 4 days, Disp-8 tablet, R-0, E-Prescribe      ferrous sulfate (FEROSUL) 325 (65 Fe) MG tablet Take 1 tablet (325 mg) by mouth daily, Disp-30 tablet, R-0, E-Prescribe      predniSONE (DELTASONE) 20 MG tablet Take 2 tablets (40 mg) by mouth daily for 4 days, Disp-8 tablet, R-0, E-Prescribe           CONTINUE these medications which have NOT CHANGED    Details   ADVAIR -21 MCG/ACT inhaler Inhale 2 puffs into the lungs 2 times daily, AUGUSTO, Historical      albuterol (PROAIR HFA/PROVENTIL HFA/VENTOLIN HFA) 108 (90 Base) MCG/ACT inhaler Inhale 2 puffs into the lungs every 6 hours as needed for shortness of breath, Historical      azelastine (ASTELIN) 0.1 % nasal spray Spray 1 spray into both nostrils 2 times daily, Historical      fluticasone (FLONASE) 50 MCG/ACT nasal spray Spray 2 sprays into both nostrils as needed for allergies, Historical                   Rationale for medication changes:      Please see above        Consults   Pulm      Immunizations given this encounter     Most Recent Immunizations   Administered Date(s) Administered     COVID-19 Monovalent 18+ (Moderna) 06/18/2021           Anticoagulation Information      Recent INR results: No results for input(s): \"INR\" in the last 168 hours.  Warfarin doses (if applicable) or name " of other anticoagulant: NA      SIGNIFICANT IMAGING FINDINGS     Results for orders placed or performed during the hospital encounter of 10/25/23   Chest XR,  PA & LAT    Impression    IMPRESSION: Mild left basilar infiltrate or scarring.   CT Chest Pulmonary Embolism w Contrast    Impression    IMPRESSION:  1.  Patient has patchy areas of bronchopneumonia involving the right lower lobe.  2.  There is underlying pan-bronchitis with peribronchial thickening, areas of retained secretions and  a mosaic appearance to the lung indicative of small airway disease.  3.  Mild reactive right hilar adenopathy.  4.  No parapneumonic effusion.  5.  There is one mild, asymmetrically enlarged left axillary node of questionable significance. Query recent vaccination? Consider nonemergent follow-up ultrasound in 4-6 weeks.  6.  No evidence of pulmonary emboli.    NOTE: ABNORMAL REPORT    1.  THE DICTATION ABOVE DESCRIBES AN ABNORMALITY FOR WHICH FOLLOW-UP IS NEEDED.    US Upper Extremity Non Vascular Left    Impression    IMPRESSION:  Borderline enlarged, morphologically normal left axillary lymph node corresponding to the finding on CT. The morphology favors reactive etiology, but suggest follow-up ultrasound in 4-6 weeks.       SIGNIFICANT LABORATORY FINDINGS     Most Recent 3 CBC's:  Recent Labs   Lab Test 10/27/23  0659 10/26/23  0700 10/25/23  2300   WBC 20.2* 21.3* 21.0*   HGB 10.4* 11.7 13.0   MCV 79 78 77*   * 127* 127*     Most Recent 3 BMP's:  Recent Labs   Lab Test 10/28/23  0804 10/27/23  2104 10/27/23  1638 10/27/23  0832 10/27/23  0659 10/26/23  2020 10/26/23  0700 10/25/23  2300   NA  --   --   --   --  139  --  136 135   POTASSIUM  --   --   --   --  3.7  --  3.6 3.2*   CHLORIDE  --   --   --   --  110*  --  106 101   CO2  --   --   --   --  17*  --  17* 17*   BUN  --   --   --   --  9.5  --  7.7 11.7   CR  --   --   --   --  0.44*  --  0.46* 0.58   ANIONGAP  --   --   --   --  12  --  13 17*   MITZI  --   --    "--   --  7.5*  --  8.0* 9.0   GLC 91 211* 229*   < > 136*   < > 223* 177*    < > = values in this interval not displayed.     Most Recent 2 LFT's:No lab results found.  Most Recent 3 INR's:No lab results found.      Discharge Orders        Reason for your hospital stay    Pneumonia     Follow-up and recommended labs and tests     Follow up with primary care provider, [facility], within 7 days for hospital follow- up.    Please have Ultrasound study for enlarged left axillary lymph nodes in next 4-6 wks through the primary MD.     Activity    Your activity upon discharge: activity as tolerated     Diet    Follow this diet upon discharge: Orders Placed This Encounter      Combination Diet Regular Diet Adult       Examination   /54 (BP Location: Left arm)   Pulse 73   Temp 97.9  F (36.6  C) (Oral)   Resp 18   Ht 1.6 m (5' 3\")   Wt 82.4 kg (181 lb 10.5 oz)   LMP 09/30/2023 (Approximate)   SpO2 97%   BMI 32.18 kg/m        General: Not in obvious distress.  HEENT: NC, AT   Chest: Clear to auscultation bilaterally  Heart: S1S2 normal, regular. No M/R/G  Abdomen: Soft. NT, ND. Bowel sounds- active.  Extremities: No legs swelling  Neuro: Alert and awake, grossly non-focal      Please see EMR for more detailed significant labs, imaging, consultant notes etc.    IFausto MBBS, personally saw the patient today and spent greater than 30 minutes discharging this patient.    LALY Kraft  [facility]    CC:Clinic, [facility]    "

## 2023-10-29 LAB
ENTEROCOCCUS FAECALIS: NOT DETECTED
ENTEROCOCCUS FAECIUM: NOT DETECTED
LISTERIA SPECIES (DETECTED/NOT DETECTED): NOT DETECTED
STAPHYLOCOCCUS AUREUS: NOT DETECTED
STAPHYLOCOCCUS EPIDERMIDIS: NOT DETECTED
STAPHYLOCOCCUS LUGDUNENSIS: NOT DETECTED
STAPHYLOCOCCUS SPECIES: NOT DETECTED
STREPTOCOCCUS AGALACTIAE: NOT DETECTED
STREPTOCOCCUS ANGINOSUS GROUP: NOT DETECTED
STREPTOCOCCUS PNEUMONIAE: NOT DETECTED
STREPTOCOCCUS PYOGENES: NOT DETECTED
STREPTOCOCCUS SPECIES: NOT DETECTED

## 2023-10-30 NOTE — RESULT ENCOUNTER NOTE
Saw the culture results called microbiology lab and spoke to Josselyn and discussed culture results, currently Verigene GP panel is negative and it is unknown which type of gram-positive cocci in clusters is present, full cultures to follow, called patient and explained culture results, as per patient denied any fever but stated has no appetite and has abdominal pain and headache, advised the patient to come to the hospital for further evaluation and repeat blood cultures and possibly IV antibiotics, patient stated she will try to come to the hospital, explained that positive cultures could be due to blood infection and could be life-threatening, patient again stated that she will try to come, advised the patient that if she develops a fever she would need to come immediately which the patient was in agreement with.  Discussed with josselyn from microbiology and she stated that she will reach out back to the hospitalist team once cultures finalized.

## 2023-10-31 LAB
BACTERIA BLD CULT: ABNORMAL
BACTERIA BLD CULT: ABNORMAL

## 2023-11-01 LAB
BACTERIA BLD CULT: NO GROWTH
BACTERIA BLD CULT: NO GROWTH

## 2024-12-10 ENCOUNTER — HOSPITAL ENCOUNTER (EMERGENCY)
Facility: CLINIC | Age: 34
Discharge: HOME | End: 2024-12-10
Admitting: STUDENT IN AN ORGANIZED HEALTH CARE EDUCATION/TRAINING PROGRAM
Payer: COMMERCIAL

## 2024-12-10 ENCOUNTER — APPOINTMENT (OUTPATIENT)
Dept: RADIOLOGY | Facility: CLINIC | Age: 34
End: 2024-12-10
Attending: STUDENT IN AN ORGANIZED HEALTH CARE EDUCATION/TRAINING PROGRAM
Payer: COMMERCIAL

## 2024-12-10 VITALS
OXYGEN SATURATION: 97 % | DIASTOLIC BLOOD PRESSURE: 59 MMHG | HEART RATE: 87 BPM | TEMPERATURE: 98 F | SYSTOLIC BLOOD PRESSURE: 108 MMHG | RESPIRATION RATE: 18 BRPM

## 2024-12-10 DIAGNOSIS — J21.0 RSV BRONCHIOLITIS: ICD-10-CM

## 2024-12-10 LAB
FLUAV RNA SPEC QL NAA+PROBE: NEGATIVE
FLUBV RNA RESP QL NAA+PROBE: NEGATIVE
RSV RNA SPEC NAA+PROBE: POSITIVE
SARS-COV-2 RNA RESP QL NAA+PROBE: NEGATIVE

## 2024-12-10 PROCEDURE — 250N000013 HC RX MED GY IP 250 OP 250 PS 637: Performed by: STUDENT IN AN ORGANIZED HEALTH CARE EDUCATION/TRAINING PROGRAM

## 2024-12-10 PROCEDURE — 99284 EMERGENCY DEPT VISIT MOD MDM: CPT | Mod: 25

## 2024-12-10 PROCEDURE — 71046 X-RAY EXAM CHEST 2 VIEWS: CPT

## 2024-12-10 PROCEDURE — 87637 SARSCOV2&INF A&B&RSV AMP PRB: CPT | Performed by: STUDENT IN AN ORGANIZED HEALTH CARE EDUCATION/TRAINING PROGRAM

## 2024-12-10 RX ORDER — ALBUTEROL SULFATE 90 UG/1
2 INHALANT RESPIRATORY (INHALATION) ONCE
Status: COMPLETED | OUTPATIENT
Start: 2024-12-10 | End: 2024-12-10

## 2024-12-10 RX ORDER — BENZONATATE 100 MG/1
100 CAPSULE ORAL 3 TIMES DAILY PRN
Qty: 10 CAPSULE | Refills: 0 | Status: SHIPPED | OUTPATIENT
Start: 2024-12-10

## 2024-12-10 RX ORDER — BENZONATATE 100 MG/1
100 CAPSULE ORAL 3 TIMES DAILY PRN
Status: DISCONTINUED | OUTPATIENT
Start: 2024-12-10 | End: 2024-12-10 | Stop reason: HOSPADM

## 2024-12-10 RX ADMIN — ALBUTEROL SULFATE 2 PUFF: 90 AEROSOL, METERED RESPIRATORY (INHALATION) at 18:54

## 2024-12-10 RX ADMIN — BENZONATATE 100 MG: 100 CAPSULE ORAL at 18:53

## 2024-12-10 ASSESSMENT — COLUMBIA-SUICIDE SEVERITY RATING SCALE - C-SSRS
1. IN THE PAST MONTH, HAVE YOU WISHED YOU WERE DEAD OR WISHED YOU COULD GO TO SLEEP AND NOT WAKE UP?: NO
6. HAVE YOU EVER DONE ANYTHING, STARTED TO DO ANYTHING, OR PREPARED TO DO ANYTHING TO END YOUR LIFE?: NO
2. HAVE YOU ACTUALLY HAD ANY THOUGHTS OF KILLING YOURSELF IN THE PAST MONTH?: NO

## 2024-12-10 NOTE — ED PROVIDER NOTES
Emergency Department Encounter   NAME: Claire Mora ; AGE: 34 year old female ; YOB: 1990 ; MRN: [id number] ; [facility]   ED PROVIDER: Smita Zepeda PA-C    Evaluation Date & Time:   No admission date for patient encounter.    CHIEF COMPLAINT:  Cough and Headache        Impression and Plan   FINAL IMPRESSION:    ICD-10-CM    1. RSV bronchiolitis  J21.0           ED Course and Medical Decision Making  Claire is a 34 year old female with PMH of sinus tachycardia, asthma, migraine headaches, iron deficiency anemia, and GERD presents to the emergency department for evaluation of nonproductive cough and headaches on and off for the past week or so.  Patient has a history of asthma.  She states she typically does not need her rescue inhaler but lately has been using it more so at night to help with chest tightness and increased coughing.  She endorses subjective feelings of fevers and chills.    Vitals reviewed and unremarkable, temp 98F.  SpO2 87% on room air.  She is not tachycardic or tachypneic.  On exam she is resting comfortably, on occasion has a dry cough. Differential diagnosis/emergent conditions considered and evaluated for includes but not limited to COVID, influenza, bronchitis, pneumonia, pneumothorax.  She has mild expiratory wheezing present in the upper lung lobes bilaterally.  No stridor or rhonchi.  No evidence of respiratory distress.  Posterior pharynx is clear. Will obtain COVID/influenza swabs, CXR, and give patient dose of albuterol here for wheezing and tessalon for cough.    Patient is endorsing improvement in her symptoms following administration of albuterol and cough.  Mild wheezing has resolved upon repeat lung auscultation.  COVID and influenza swabs are negative.  RSV is positive today.  Chest x-ray shows left perihilar bronchial wall thickening that can be seen with bronchitis.  This is consistent with RSV bronchiolitis.  No evidence of pneumonia.  I  updated patient on positive RSV swab and chest x-ray findings today.  I encouraged her to continue to keep an eye on her symptoms at home and use her inhalers as needed for any shortness of breath and chest tightness.  I have given her a prescription for Tessalon to help with cough suppression.  I recommended she follow-up with her primary care provider in a week for recheck if her symptoms have not improved.  She was educated on concerning symptoms that would warrant return to the ER and is understanding and agreeable to this plan.        Supplemental history:  Obtained supplemental history:Supplemental history obtained?: No  Reviewed external records: External records reviewed?: No  Patient information was obtained from: patient  Use of Intrepreter: N/A     Complicating Factors:  Care impacted by chronic illness:Documented in Chart  Care significantly affected by social determinants of health:N/A    Work Up:  Did you consider but not order tests?: Work up considered but not performed and documented in chart, if applicable  Did you interpret images independently?: Independent interpretation of ECG and images noted in documentation, when applicable.    External Consults:  Consultation discussion with other provider:Did you involve another provider (consultant, , pharmacy, etc.)?: No  Discharge. I prescribed additional prescription strength medication(s) as charted. See documentation for any additional details.  I considered escalation of care and admitting the patient but ultimately did not given reassuring exam and workup.          ED COURSE:  4:55 PM I met with the patient, obtained history, performed an initial exam, and discussed options and plan for diagnostics and treatment here in the ED.  I met and introduced myself to the patient. I gathered initial history and performed my physical exam. We discussed plan for initial workup.   7:59 PM I rechecked the patient and discussed results, discharge, follow up,  and reasons to return to the ED.     At the conclusion of the encounter I discussed the results of all the tests and the disposition. The questions were answered. The patient or family acknowledged understanding and was agreeable with the care plan.          MEDICATIONS GIVEN IN THE EMERGENCY DEPARTMENT:  Medications   albuterol (PROVENTIL HFA/VENTOLIN HFA) inhaler (2 puffs Inhalation $Given 12/10/24 3704)         NEW PRESCRIPTIONS STARTED AT TODAY'S ED VISIT:  Discharge Medication List as of 12/10/2024  8:18 PM        START taking these medications    Details   benzonatate (TESSALON) 100 MG capsule Take 1 capsule (100 mg) by mouth 3 times daily as needed for cough., Disp-10 capsule, R-0, Local Print               HPI     Claire Mora is a 34 year old female with a pertinent history of asthma who presents to the ED by walking for evaluation of a cough and headache.    Per patient, she has had a dry/unproductive cough for the past week. Along with that she has had an off and on headache, chills, and fever (no confirmed temperature at home). She is an asthmatic and uses a rescue and albuterol inhaler. Recently she has been using her albuterol inhaler more frequently.    She has had no congestion, sore throat, or shortness of breath. She has had no recent sickness. In her home no one has been sick.        Physical Exam     First Vitals:  Patient Vitals for the past 24 hrs:   BP Temp Temp src Pulse Resp SpO2   12/10/24 2022 108/59 -- -- 87 18 97 %   12/10/24 1645 118/62 98  F (36.7  C) Oral 94 18 97 %         PHYSICAL EXAM    General Appearance:  Alert, cooperative, no distress, appears stated age. Non toxic appearing.  HENT: Normocephalic without obvious deformity, atraumatic. Mucous membranes moist   Eyes: Conjunctiva clear, Lids normal. No discharge.   Respiratory: No distress.  He has mild expiratory wheezing present in the upper lungs bilaterally.  No rhonchi or stridor. No accessory muscle use or increased work  of breathing.  Cardiovascular: Regular rate and rhythm, no murmur. Normal cap refill. No peripheral edema  Musculoskeletal: Moving all extremities. No gross deformities  Integument: Warm, dry, no rashes or lesions  Neurologic: Alert and orientated x3. No focal deficits.  Psych: Normal mood and affect        Results     LAB:  All pertinent labs reviewed and interpreted  Labs Ordered and Resulted from Time of ED Arrival to Time of ED Departure   INFLUENZA A/B, RSV AND SARS-COV2 PCR - Abnormal       Result Value    Influenza A PCR Negative      Influenza B PCR Negative      RSV PCR Positive (*)     SARS CoV2 PCR Negative         RADIOLOGY:  Chest XR,  PA & LAT   Final Result   IMPRESSION: Heart size and pulmonary vascularity normal. Left perihilar bronchial wall thickening can be seen with bronchitis. Minimal scarring or atelectasis left base. The right lung is clear. No effusions.            ECG:  N/a      PROCEDURES:  N/A      IChandler, am serving as a scribe to document services personally performed by Smita Zepeda PA-C, based on my observation and the provider's statements to me. ISmita PA-C attest that Chandler Carrillo is acting in a scribe capacity, has observed my performance of the services and has documented them in accordance with my direction.       Smita Zepeda PA-C   Emergency Medicine   [facility] EMERGENCY ROOM      mSita Zepeda PA-C  12/11/24 0003

## 2024-12-11 NOTE — DISCHARGE INSTRUCTIONS
You were seen in the emergency department today for cough and headache.  Your RSV swab is positive.  COVID and influenza are negative.  Chest x-ray does not show any evidence of pneumonia.  This does show inflammation in your lungs. Given your history of asthma you are at increased risk for reactive airway whenever you get a viral infection.    Keep using your albuterol at home as needed for shortness of breath and coughing.  I have also given you a prescription for Tessalon Perles, this is a cough suppressant medication you can use up to 3 times daily.  Call your primary care office to schedule follow-up in a week if your symptoms are not improving.  Return to the emergency department if you develop any high fevers or severe worsened difficulty breathing or chest pain

## 2025-01-27 ENCOUNTER — HOSPITAL ENCOUNTER (OUTPATIENT)
Facility: CLINIC | Age: 35
Setting detail: OBSERVATION
Discharge: HOME | End: 2025-01-28
Attending: EMERGENCY MEDICINE | Admitting: EMERGENCY MEDICINE
Payer: COMMERCIAL

## 2025-01-27 ENCOUNTER — APPOINTMENT (OUTPATIENT)
Dept: CT IMAGING | Facility: CLINIC | Age: 35
End: 2025-01-27
Attending: EMERGENCY MEDICINE
Payer: COMMERCIAL

## 2025-01-27 DIAGNOSIS — R09.02 HYPOXIA: ICD-10-CM

## 2025-01-27 DIAGNOSIS — A04.8 H. PYLORI INFECTION: ICD-10-CM

## 2025-01-27 DIAGNOSIS — Z79.2 PROPHYLACTIC ANTIBIOTIC: ICD-10-CM

## 2025-01-27 DIAGNOSIS — G89.18 ACUTE POST-OPERATIVE PAIN: ICD-10-CM

## 2025-01-27 DIAGNOSIS — Z87.09 HISTORY OF ASTHMA: ICD-10-CM

## 2025-01-27 DIAGNOSIS — K21.9 ACID REFLUX: ICD-10-CM

## 2025-01-27 DIAGNOSIS — J45.21 MILD INTERMITTENT ASTHMA WITH EXACERBATION: Primary | ICD-10-CM

## 2025-01-27 LAB
ALBUMIN SERPL BCG-MCNC: 4 G/DL (ref 3.5–5.2)
ALP SERPL-CCNC: 87 U/L (ref 40–150)
ALT SERPL W P-5'-P-CCNC: 18 U/L (ref 0–50)
ANION GAP SERPL CALCULATED.3IONS-SCNC: 12 MMOL/L (ref 7–15)
AST SERPL W P-5'-P-CCNC: 42 U/L (ref 0–45)
ATRIAL RATE - MUSE: 78 BPM
BASE EXCESS BLDA CALC-SCNC: -2.7 MMOL/L (ref -3–3)
BASOPHILS # BLD AUTO: 0 10E3/UL (ref 0–0.2)
BASOPHILS NFR BLD AUTO: 0 %
BILIRUB DIRECT SERPL-MCNC: NORMAL MG/DL
BILIRUB SERPL-MCNC: 0.4 MG/DL
BUN SERPL-MCNC: 13.6 MG/DL (ref 6–20)
CALCIUM SERPL-MCNC: 8.5 MG/DL (ref 8.8–10.4)
CHLORIDE SERPL-SCNC: 105 MMOL/L (ref 98–107)
CREAT SERPL-MCNC: 0.5 MG/DL (ref 0.51–0.95)
DIASTOLIC BLOOD PRESSURE - MUSE: 64 MMHG
EGFRCR SERPLBLD CKD-EPI 2021: >90 ML/MIN/1.73M2
EOSINOPHIL # BLD AUTO: 0.3 10E3/UL (ref 0–0.7)
EOSINOPHIL NFR BLD AUTO: 3 %
ERYTHROCYTE [DISTWIDTH] IN BLOOD BY AUTOMATED COUNT: 15.9 % (ref 10–15)
FLUAV RNA SPEC QL NAA+PROBE: NEGATIVE
FLUBV RNA RESP QL NAA+PROBE: NEGATIVE
GLUCOSE SERPL-MCNC: 98 MG/DL (ref 70–99)
HCO3 BLD-SCNC: 21 MMOL/L (ref 21–28)
HCO3 SERPL-SCNC: 20 MMOL/L (ref 22–29)
HCT VFR BLD AUTO: 36.8 % (ref 35–47)
HGB BLD-MCNC: 11.3 G/DL (ref 11.7–15.7)
IMM GRANULOCYTES # BLD: 0 10E3/UL
IMM GRANULOCYTES NFR BLD: 0 %
INTERPRETATION ECG - MUSE: NORMAL
LIPASE SERPL-CCNC: 16 U/L (ref 13–60)
LYMPHOCYTES # BLD AUTO: 2.3 10E3/UL (ref 0.8–5.3)
LYMPHOCYTES NFR BLD AUTO: 21 %
MCH RBC QN AUTO: 21.9 PG (ref 26.5–33)
MCHC RBC AUTO-ENTMCNC: 30.7 G/DL (ref 31.5–36.5)
MCV RBC AUTO: 71 FL (ref 78–100)
MONOCYTES # BLD AUTO: 0.5 10E3/UL (ref 0–1.3)
MONOCYTES NFR BLD AUTO: 4 %
NEUTROPHILS # BLD AUTO: 7.8 10E3/UL (ref 1.6–8.3)
NEUTROPHILS NFR BLD AUTO: 72 %
NRBC # BLD AUTO: 0 10E3/UL
NRBC BLD AUTO-RTO: 0 /100
O2/TOTAL GAS SETTING VFR VENT: 21 %
OXYHGB MFR BLDA: 92 % (ref 92–100)
P AXIS - MUSE: 71 DEGREES
PCO2 BLD: 32 MM HG (ref 35–45)
PEEP: 0 CM H2O
PH BLD: 7.42 [PH] (ref 7.35–7.45)
PLATELET # BLD AUTO: 215 10E3/UL (ref 150–450)
PO2 BLD: 64 MM HG (ref 80–105)
POTASSIUM SERPL-SCNC: 4.5 MMOL/L (ref 3.4–5.3)
PR INTERVAL - MUSE: 142 MS
PROT SERPL-MCNC: 7.5 G/DL (ref 6.4–8.3)
QRS DURATION - MUSE: 78 MS
QT - MUSE: 374 MS
QTC - MUSE: 426 MS
R AXIS - MUSE: 63 DEGREES
RBC # BLD AUTO: 5.16 10E6/UL (ref 3.8–5.2)
RSV RNA SPEC NAA+PROBE: NEGATIVE
SAO2 % BLDA: 93.1 % (ref 96–97)
SARS-COV-2 RNA RESP QL NAA+PROBE: NEGATIVE
SODIUM SERPL-SCNC: 137 MMOL/L (ref 135–145)
SYSTOLIC BLOOD PRESSURE - MUSE: 111 MMHG
T AXIS - MUSE: 57 DEGREES
TROPONIN T SERPL HS-MCNC: <6 NG/L
VENTRICULAR RATE- MUSE: 78 BPM
WBC # BLD AUTO: 10.9 10E3/UL (ref 4–11)

## 2025-01-27 PROCEDURE — 99223 1ST HOSP IP/OBS HIGH 75: CPT | Mod: GC | Performed by: DIETITIAN, REGISTERED

## 2025-01-27 PROCEDURE — 250N000011 HC RX IP 250 OP 636: Performed by: EMERGENCY MEDICINE

## 2025-01-27 PROCEDURE — 83690 ASSAY OF LIPASE: CPT | Performed by: EMERGENCY MEDICINE

## 2025-01-27 PROCEDURE — 82310 ASSAY OF CALCIUM: CPT | Performed by: EMERGENCY MEDICINE

## 2025-01-27 PROCEDURE — 71275 CT ANGIOGRAPHY CHEST: CPT

## 2025-01-27 PROCEDURE — 99285 EMERGENCY DEPT VISIT HI MDM: CPT | Mod: 25

## 2025-01-27 PROCEDURE — 250N000013 HC RX MED GY IP 250 OP 250 PS 637

## 2025-01-27 PROCEDURE — 84484 ASSAY OF TROPONIN QUANT: CPT | Performed by: EMERGENCY MEDICINE

## 2025-01-27 PROCEDURE — 87486 CHLMYD PNEUM DNA AMP PROBE: CPT | Performed by: DIETITIAN, REGISTERED

## 2025-01-27 PROCEDURE — 36415 COLL VENOUS BLD VENIPUNCTURE: CPT | Performed by: EMERGENCY MEDICINE

## 2025-01-27 PROCEDURE — 82805 BLOOD GASES W/O2 SATURATION: CPT | Performed by: EMERGENCY MEDICINE

## 2025-01-27 PROCEDURE — 999N000157 HC STATISTIC RCP TIME EA 10 MIN

## 2025-01-27 PROCEDURE — 70486 CT MAXILLOFACIAL W/O DYE: CPT

## 2025-01-27 PROCEDURE — 250N000013 HC RX MED GY IP 250 OP 250 PS 637: Performed by: EMERGENCY MEDICINE

## 2025-01-27 PROCEDURE — 80048 BASIC METABOLIC PNL TOTAL CA: CPT | Performed by: EMERGENCY MEDICINE

## 2025-01-27 PROCEDURE — 250N000009 HC RX 250: Performed by: EMERGENCY MEDICINE

## 2025-01-27 PROCEDURE — 96375 TX/PRO/DX INJ NEW DRUG ADDON: CPT

## 2025-01-27 PROCEDURE — G0378 HOSPITAL OBSERVATION PER HR: HCPCS

## 2025-01-27 PROCEDURE — 87637 SARSCOV2&INF A&B&RSV AMP PRB: CPT | Performed by: EMERGENCY MEDICINE

## 2025-01-27 PROCEDURE — 36600 WITHDRAWAL OF ARTERIAL BLOOD: CPT

## 2025-01-27 PROCEDURE — 87581 M.PNEUMON DNA AMP PROBE: CPT | Performed by: DIETITIAN, REGISTERED

## 2025-01-27 PROCEDURE — 85025 COMPLETE CBC W/AUTO DIFF WBC: CPT | Performed by: EMERGENCY MEDICINE

## 2025-01-27 PROCEDURE — 250N000013 HC RX MED GY IP 250 OP 250 PS 637: Performed by: DIETITIAN, REGISTERED

## 2025-01-27 PROCEDURE — 94640 AIRWAY INHALATION TREATMENT: CPT

## 2025-01-27 PROCEDURE — 93005 ELECTROCARDIOGRAM TRACING: CPT | Performed by: EMERGENCY MEDICINE

## 2025-01-27 PROCEDURE — 96374 THER/PROPH/DIAG INJ IV PUSH: CPT

## 2025-01-27 PROCEDURE — 84075 ASSAY ALKALINE PHOSPHATASE: CPT | Performed by: EMERGENCY MEDICINE

## 2025-01-27 PROCEDURE — 80053 COMPREHEN METABOLIC PANEL: CPT | Performed by: EMERGENCY MEDICINE

## 2025-01-27 PROCEDURE — 70450 CT HEAD/BRAIN W/O DYE: CPT

## 2025-01-27 RX ORDER — METOCLOPRAMIDE HYDROCHLORIDE 5 MG/ML
10 INJECTION INTRAMUSCULAR; INTRAVENOUS ONCE
Status: COMPLETED | OUTPATIENT
Start: 2025-01-27 | End: 2025-01-27

## 2025-01-27 RX ORDER — ACETAMINOPHEN 325 MG/1
325 TABLET ORAL EVERY 6 HOURS
COMMUNITY
Start: 2025-01-24

## 2025-01-27 RX ORDER — HYDROMORPHONE HYDROCHLORIDE 2 MG/1
2-4 TABLET ORAL EVERY 6 HOURS PRN
Status: DISCONTINUED | OUTPATIENT
Start: 2025-01-27 | End: 2025-01-27

## 2025-01-27 RX ORDER — HYDROMORPHONE HYDROCHLORIDE 2 MG/1
2-4 TABLET ORAL EVERY 6 HOURS PRN
COMMUNITY
Start: 2025-01-24

## 2025-01-27 RX ORDER — IOPAMIDOL 755 MG/ML
100 INJECTION, SOLUTION INTRAVASCULAR ONCE
Status: COMPLETED | OUTPATIENT
Start: 2025-01-27 | End: 2025-01-27

## 2025-01-27 RX ORDER — IPRATROPIUM BROMIDE AND ALBUTEROL SULFATE 2.5; .5 MG/3ML; MG/3ML
3 SOLUTION RESPIRATORY (INHALATION) ONCE
Status: COMPLETED | OUTPATIENT
Start: 2025-01-27 | End: 2025-01-27

## 2025-01-27 RX ORDER — DIPHENHYDRAMINE HYDROCHLORIDE 50 MG/ML
25 INJECTION INTRAMUSCULAR; INTRAVENOUS ONCE
Status: COMPLETED | OUTPATIENT
Start: 2025-01-27 | End: 2025-01-27

## 2025-01-27 RX ORDER — ONDANSETRON 2 MG/ML
4 INJECTION INTRAMUSCULAR; INTRAVENOUS EVERY 6 HOURS PRN
Status: DISCONTINUED | OUTPATIENT
Start: 2025-01-27 | End: 2025-01-28 | Stop reason: HOSPADM

## 2025-01-27 RX ORDER — KETOROLAC TROMETHAMINE 15 MG/ML
15 INJECTION, SOLUTION INTRAMUSCULAR; INTRAVENOUS ONCE
Status: COMPLETED | OUTPATIENT
Start: 2025-01-27 | End: 2025-01-27

## 2025-01-27 RX ORDER — ONDANSETRON 4 MG/1
4 TABLET, FILM COATED ORAL 3 TIMES DAILY PRN
COMMUNITY
Start: 2024-12-06

## 2025-01-27 RX ORDER — PANTOPRAZOLE SODIUM 40 MG/1
40 TABLET, DELAYED RELEASE ORAL
Status: DISCONTINUED | OUTPATIENT
Start: 2025-01-28 | End: 2025-01-28 | Stop reason: HOSPADM

## 2025-01-27 RX ORDER — ACETAMINOPHEN 650 MG/1
650 SUPPOSITORY RECTAL EVERY 4 HOURS PRN
Status: DISCONTINUED | OUTPATIENT
Start: 2025-01-27 | End: 2025-01-28 | Stop reason: HOSPADM

## 2025-01-27 RX ORDER — NALOXONE HYDROCHLORIDE 0.4 MG/ML
0.4 INJECTION, SOLUTION INTRAMUSCULAR; INTRAVENOUS; SUBCUTANEOUS
Status: DISCONTINUED | OUTPATIENT
Start: 2025-01-27 | End: 2025-01-28 | Stop reason: HOSPADM

## 2025-01-27 RX ORDER — ACETAMINOPHEN 325 MG/1
650 TABLET ORAL EVERY 6 HOURS PRN
Status: DISCONTINUED | OUTPATIENT
Start: 2025-01-27 | End: 2025-01-27

## 2025-01-27 RX ORDER — ALBUTEROL SULFATE 90 UG/1
2 INHALANT RESPIRATORY (INHALATION) EVERY 6 HOURS PRN
Status: DISCONTINUED | OUTPATIENT
Start: 2025-01-27 | End: 2025-01-28 | Stop reason: HOSPADM

## 2025-01-27 RX ORDER — AMOXICILLIN 500 MG/1
1000 TABLET, FILM COATED ORAL 2 TIMES DAILY
Status: ON HOLD | COMMUNITY
Start: 2025-01-13 | End: 2025-01-28

## 2025-01-27 RX ORDER — ACETAMINOPHEN 325 MG/1
975 TABLET ORAL ONCE
Status: COMPLETED | OUTPATIENT
Start: 2025-01-27 | End: 2025-01-27

## 2025-01-27 RX ORDER — CETIRIZINE HYDROCHLORIDE 10 MG/1
10 TABLET ORAL DAILY
COMMUNITY

## 2025-01-27 RX ORDER — PROCHLORPERAZINE MALEATE 10 MG
10 TABLET ORAL EVERY 6 HOURS PRN
Status: DISCONTINUED | OUTPATIENT
Start: 2025-01-27 | End: 2025-01-28 | Stop reason: HOSPADM

## 2025-01-27 RX ORDER — HYDROCORTISONE AND ACETIC ACID 20.75; 10.375 MG/ML; MG/ML
3 SOLUTION AURICULAR (OTIC) 4 TIMES DAILY
COMMUNITY
Start: 2025-01-06 | End: 2025-01-27

## 2025-01-27 RX ORDER — FLUTICASONE FUROATE AND VILANTEROL 100; 25 UG/1; UG/1
1 POWDER RESPIRATORY (INHALATION) DAILY
Status: DISCONTINUED | OUTPATIENT
Start: 2025-01-28 | End: 2025-01-28 | Stop reason: HOSPADM

## 2025-01-27 RX ORDER — ACETAMINOPHEN 325 MG/1
650 TABLET ORAL EVERY 4 HOURS PRN
Status: DISCONTINUED | OUTPATIENT
Start: 2025-01-27 | End: 2025-01-28 | Stop reason: HOSPADM

## 2025-01-27 RX ORDER — PREDNISONE 20 MG/1
40 TABLET ORAL DAILY
Status: DISCONTINUED | OUTPATIENT
Start: 2025-01-28 | End: 2025-01-28 | Stop reason: HOSPADM

## 2025-01-27 RX ORDER — METHYLPREDNISOLONE SODIUM SUCCINATE 125 MG/2ML
125 INJECTION INTRAMUSCULAR; INTRAVENOUS ONCE
Status: COMPLETED | OUTPATIENT
Start: 2025-01-27 | End: 2025-01-27

## 2025-01-27 RX ORDER — CETIRIZINE HYDROCHLORIDE 10 MG/1
10 TABLET ORAL DAILY
Status: DISCONTINUED | OUTPATIENT
Start: 2025-01-28 | End: 2025-01-28 | Stop reason: HOSPADM

## 2025-01-27 RX ORDER — NALOXONE HYDROCHLORIDE 0.4 MG/ML
0.2 INJECTION, SOLUTION INTRAMUSCULAR; INTRAVENOUS; SUBCUTANEOUS
Status: DISCONTINUED | OUTPATIENT
Start: 2025-01-27 | End: 2025-01-28 | Stop reason: HOSPADM

## 2025-01-27 RX ORDER — ONDANSETRON 4 MG/1
4 TABLET, ORALLY DISINTEGRATING ORAL EVERY 6 HOURS PRN
Status: DISCONTINUED | OUTPATIENT
Start: 2025-01-27 | End: 2025-01-28 | Stop reason: HOSPADM

## 2025-01-27 RX ORDER — AMOXICILLIN 250 MG
2 CAPSULE ORAL 2 TIMES DAILY PRN
Status: DISCONTINUED | OUTPATIENT
Start: 2025-01-27 | End: 2025-01-28 | Stop reason: HOSPADM

## 2025-01-27 RX ORDER — HYDROMORPHONE HYDROCHLORIDE 2 MG/1
2-4 TABLET ORAL EVERY 6 HOURS PRN
Status: DISCONTINUED | OUTPATIENT
Start: 2025-01-27 | End: 2025-01-28 | Stop reason: HOSPADM

## 2025-01-27 RX ORDER — ECHINACEA PURPUREA EXTRACT 125 MG
1 TABLET ORAL
COMMUNITY
Start: 2025-01-24

## 2025-01-27 RX ORDER — AMOXICILLIN 250 MG
1 CAPSULE ORAL 2 TIMES DAILY PRN
Status: DISCONTINUED | OUTPATIENT
Start: 2025-01-27 | End: 2025-01-28 | Stop reason: HOSPADM

## 2025-01-27 RX ORDER — OXYMETAZOLINE HYDROCHLORIDE 0.05 G/100ML
2 SPRAY NASAL SEE ADMIN INSTRUCTIONS
COMMUNITY
Start: 2025-01-24 | End: 2025-01-27

## 2025-01-27 RX ORDER — CLARITHROMYCIN 500 MG/1
500 TABLET ORAL 2 TIMES DAILY
Status: ON HOLD | COMMUNITY
Start: 2025-01-13 | End: 2025-01-28

## 2025-01-27 RX ADMIN — ACETAMINOPHEN 975 MG: 325 TABLET ORAL at 10:12

## 2025-01-27 RX ADMIN — SALINE NASAL SPRAY 1 SPRAY: 1.5 SOLUTION NASAL at 18:36

## 2025-01-27 RX ADMIN — DIPHENHYDRAMINE HYDROCHLORIDE 25 MG: 50 INJECTION INTRAMUSCULAR; INTRAVENOUS at 12:22

## 2025-01-27 RX ADMIN — ACETAMINOPHEN 650 MG: 325 TABLET ORAL at 19:53

## 2025-01-27 RX ADMIN — SENNOSIDES AND DOCUSATE SODIUM 1 TABLET: 50; 8.6 TABLET ORAL at 20:49

## 2025-01-27 RX ADMIN — IPRATROPIUM BROMIDE AND ALBUTEROL SULFATE 3 ML: .5; 3 SOLUTION RESPIRATORY (INHALATION) at 14:18

## 2025-01-27 RX ADMIN — METOCLOPRAMIDE 10 MG: 5 INJECTION, SOLUTION INTRAMUSCULAR; INTRAVENOUS at 12:22

## 2025-01-27 RX ADMIN — SALINE NASAL SPRAY 1 SPRAY: 1.5 SOLUTION NASAL at 19:47

## 2025-01-27 RX ADMIN — CEPHALEXIN 250 MG: 250 CAPSULE ORAL at 21:43

## 2025-01-27 RX ADMIN — KETOROLAC TROMETHAMINE 15 MG: 15 INJECTION, SOLUTION INTRAMUSCULAR; INTRAVENOUS at 12:24

## 2025-01-27 RX ADMIN — IPRATROPIUM BROMIDE AND ALBUTEROL SULFATE 3 ML: .5; 3 SOLUTION RESPIRATORY (INHALATION) at 13:02

## 2025-01-27 RX ADMIN — IOPAMIDOL 100 ML: 755 INJECTION, SOLUTION INTRAVENOUS at 13:27

## 2025-01-27 RX ADMIN — SALINE NASAL SPRAY 1 SPRAY: 1.5 SOLUTION NASAL at 21:44

## 2025-01-27 RX ADMIN — METHYLPREDNISOLONE SODIUM SUCCINATE 125 MG: 125 INJECTION, POWDER, FOR SOLUTION INTRAMUSCULAR; INTRAVENOUS at 15:29

## 2025-01-27 ASSESSMENT — ENCOUNTER SYMPTOMS
CHILLS: 0
NAUSEA: 1
COUGH: 0
SORE THROAT: 0
FEVER: 0
SINUS PAIN: 1
VOMITING: 1
HEADACHES: 1
SHORTNESS OF BREATH: 0
CONSTIPATION: 1

## 2025-01-27 ASSESSMENT — COLUMBIA-SUICIDE SEVERITY RATING SCALE - C-SSRS
2. HAVE YOU ACTUALLY HAD ANY THOUGHTS OF KILLING YOURSELF IN THE PAST MONTH?: NO
6. HAVE YOU EVER DONE ANYTHING, STARTED TO DO ANYTHING, OR PREPARED TO DO ANYTHING TO END YOUR LIFE?: NO
1. IN THE PAST MONTH, HAVE YOU WISHED YOU WERE DEAD OR WISHED YOU COULD GO TO SLEEP AND NOT WAKE UP?: NO

## 2025-01-27 ASSESSMENT — ACTIVITIES OF DAILY LIVING (ADL)
ADLS_ACUITY_SCORE: 52
ADLS_ACUITY_SCORE: 56
ADLS_ACUITY_SCORE: 52
ADLS_ACUITY_SCORE: 52
ADLS_ACUITY_SCORE: 56
ADLS_ACUITY_SCORE: 52
ADLS_ACUITY_SCORE: 30
ADLS_ACUITY_SCORE: 52

## 2025-01-27 NOTE — PROGRESS NOTES
Pt given one time  Duoneb treatment. BS expiratory/inspiratory wheezes pre and expiratory wheeze and clear with increased aeration post treatment. Pt SpO2 dropped down to 85% after neb treatment pt placed on 6L NC pt SpO2 increased only to 90%. RT placed pt on 8L Oxymask and then increased to 10L to keep sat's above 90%. RT sandee ABG Most Recent ABG:   Recent Labs   Lab Test 01/27/25  1601   PH 7.42   PO2 64*   PCO2 32*   HCO3 21   KALEB -2.7   Pt was on RA sating 93%. BS clear;diminished.

## 2025-01-27 NOTE — DISCHARGE INSTRUCTIONS
You were seen here today for evaluation of a headache and heart burn. Your lab workup today is negative without evidence of significant blood loss, infection, liver or kidney dysfunction, or heart attack. Your CT scan shows expected surgical changes but nothing acutely concerning.     Continue taking tylenol and dilaudid for treatment of pain. Take Pepcid 20 mg twice daily for acid reflux. For constipation, take miralax one capful daily.     Follow up with ENT as planned. Return to the ER for any new or worsening symptoms including severe pain, fever, vomiting, pus discharge from the nose, difficulty breathing, or any other symptoms that concern you.

## 2025-01-27 NOTE — H&P
[facility]    History and Physical - Hospitalist Service       Date of Admission:  1/27/2025    Assessment & Plan      Claire Mora is a 34 year old female admitted on 1/27/2025. She has a history of GERD, H. Pylori, chronic pansinusitis s/p endoscopic sinus surgery on 1/24, and is admitted for acute hypoxic respiratory failure with unknown cause.    Med rec pending    Acute hypoxic respiratory failure, resolved  Suspected asthma exacerbation versus allergic reaction  Hx of chronic pansinusitis s/p sinus surgery 1/24  Patient has well controlled asthma on PTA medications. Has only had 1-2 asthma exacerbations in the past following taking ibuprofen. CT PE on admission negative for pneumonia or PE. It did show: Moderate scattered areas of mucous plugging throughout both lungs. EKG normal, trop negative. VBG unremarkable. CT head and sinus with findings consistent with recent sinus surgery. It is possible this brief episode of hypoxia was related to the dose of ketorolac that that patient received in the ED, although it is unclear whether her symptoms started before the dose was given. Will treat her symptoms as an asthma exacerbation, although she is currently back to baseline and satting >95% on room air. Low suspicion for pneumonia.  - Supplemental O2 as needed  - Duonebs QID PRN  - Prednisone 40mg x5 days  - PTA Keflex 250 mg TID (1/24-1/30) for ppx around surgery  - PTA nasal sprays   - Pain: PRN tylenol, PTA dilaudid 2 mg PO Q6H PRN  - PTA Advair, recommend considering Symbicort as alternative inhaler on outpatient follow up  - Hold PTA albuterol inhaler   - PTA cetrizine    GERD  H. Pylori  Hx of chronic Hep B  Patient has not yet re-started treatement for h. Pylori due to her recent surgery. Has been prescribed amoxicillin and clarithromycin x14 days for this. Heartburn has been worse since surgery. Lipase and LFTs normal on admission. Per chart review, hep B labs last done in  8/2024 and liver US was negative for focal mass at that time.  - Start omeprazole 40 mg daily    Iron deficiency anemia: PTA ferrous sulfate     Observation Goals: -diagnostic tests and consults completed and resulted, -vital signs normal or at patient baseline, -dyspnea improved and O2 sats greater than 88% on room air or prior home oxygen levels, Nurse to notify provider when observation goals have been met and patient is ready for discharge.  Diet: Regular Diet Adult  DVT Prophylaxis: Ambulate every shift  Sainz Catheter: Not present  Fluids: PO  Lines: None     Cardiac Monitoring: None  Code Status: Full Code    Clinically Significant Risk Factors Present on Admission           # Hypocalcemia: Lowest Ca = 8.5 mg/dL in last 2 days, will monitor and replace as appropriate               # Acute Hypoxic Respiratory Failure: Documented O2 saturation < 90%. Continue supplemental oxygen as needed            # Asthma: noted on problem list        Disposition Plan      Expected Discharge Date: 01/28/2025                The patient's care was discussed with the Attending Physician, Dr. Georges .      Tracee Mai MD  Hospitalist Service  [facility]  Securely message with [employer] (more info)  Text page via [facility] Paging/Directory   ______________________________________________________________________    Chief Complaint   Shortness of breath    History is obtained from the patient    History of Present Illness   Claire Mora is a 34 year old female admitted on 1/27/2025. She has a history of GERD, H. Pylori, asthma, chronic pansinusitis s/p endoscopic sinus surgery on 1/24 and is admitted for acute hypoxic respiratory failure with unknown cause.     1/24 had sinus surgery w [facility], no complications. Chief complaint today headache. Also epigastric pain. Hx of GERD and h. Pylori, has not yet started treatement and epigastric pain worse since surgery. At 1200 in the ED started complaining of  shortness of breath and O2 dropped to 82% on room air. She did get a dose of toradol in the ED around 1230 and has a hx of asthma exacerbation triggered by ibuprofen. She feels her asthma is well controlled on advair. CT PE negative for pna, PE. Trop negative, EKG normal.     Per chart review, patient recently underwent endoscopic sinus surgery at [facility] on 1/24, including bilateral maxillary antrostomy with tissue removal, bilateral total ethmoidectomy, bilateral sphenoid sinusotomies, bilateral frontal sinusotomy, draft 1 procedure.    Now feels that her breathing is back to baseline. Has a mild headache. The headache started following her surgery.     Past Medical History    No past medical history on file.    Past Surgical History   No past surgical history on file.    Prior to Admission Medications   Prior to Admission Medications   Prescriptions Last Dose Informant Patient Reported? Taking?   ADVAIR -21 MCG/ACT inhaler  Self Yes No   Sig: Inhale 2 puffs into the lungs 2 times daily   HYDROmorphone (DILAUDID) 2 MG tablet   Yes Yes   Sig: Take 2-4 mg by mouth every 6 hours as needed for pain.   SODIUM CHLORIDE 0.65 % nasal spray   Yes Yes   Sig: Spray 1 spray in nostril every 2 hours as needed for congestion.   acetaminophen (TYLENOL) 325 MG tablet   Yes Yes   Sig: Take 325 mg by mouth every 6 hours.   acetic acid-hydrocortisone (VOSOL-HC) 1-2 % otic solution   Yes Yes   Sig: Place 3 drops into both ears 4 times daily. X 7 days   albuterol (PROAIR HFA/PROVENTIL HFA/VENTOLIN HFA) 108 (90 Base) MCG/ACT inhaler  Self Yes No   Sig: Inhale 2 puffs into the lungs every 6 hours as needed for shortness of breath   amoxicillin (AMOXIL) 500 MG tablet   Yes No   Sig: Take 1,000 mg by mouth 2 times daily. X14 days   azelastine (ASTELIN) 0.1 % nasal spray  Self Yes No   Sig: Spray 1 spray into both nostrils 2 times daily   benzonatate (TESSALON) 100 MG capsule   No No   Sig: Take 1 capsule (100 mg) by mouth 3  times daily as needed for cough.   cephALEXin (KEFLEX) 250 MG capsule   Yes Yes   Sig: Take 250 mg by mouth 3 times daily.   cetirizine (ZYRTEC) 10 MG tablet   Yes No   Sig: Take 10 mg by mouth daily.   clarithromycin (BIAXIN) 500 MG tablet   Yes No   Sig: Take 500 mg by mouth 2 times daily.   ferrous sulfate (FEROSUL) 325 (65 Fe) MG tablet   No No   Sig: Take 1 tablet (325 mg) by mouth daily   fluticasone (FLONASE) 50 MCG/ACT nasal spray  Self Yes No   Sig: Spray 2 sprays into both nostrils as needed for allergies   omeprazole (PRILOSEC) 20 MG DR capsule   Yes Yes   Sig: Take 20 mg by mouth 2 times daily. X 14 days   ondansetron (ZOFRAN) 4 MG tablet   Yes Yes   Sig: Take 4 mg by mouth 3 times daily as needed for nausea.   oxymetazoline (AFRIN) 0.05 % nasal spray   Yes No   Sig: Spray 2 sprays in nostril See Admin Instructions. Twice daily x 3 days, maximum duration of use is 3 days.      Facility-Administered Medications: None        Review of Systems    The 5 point Review of Systems is negative other than noted in the HPI.     Social History   I have reviewed this patient's social history and updated it with pertinent information if needed.  Social History     Tobacco Use    Smoking status: Never    Smokeless tobacco: Never        Physical Exam   Vital Signs: Temp: 98  F (36.7  C) Temp src: Oral BP: 112/60 Pulse: 97   Resp: 20 SpO2: 95 % O2 Device: None (Room air) Oxygen Delivery: 4 LPM  Weight: 176 lbs 0 oz    Constitutional: awake, alert, cooperative, no apparent distress, and appears stated age  Respiratory: No increased work of breathing, good air exchange, clear to auscultation bilaterally, no crackles, minimal wheezing in lung apices bilaterally, cleared with deep breaths  Cardiovascular: regular rate and rhythm and no edema  GI: soft, non-distended, non-tender  Skin: normal skin color, texture, turgor  Musculoskeletal: tone is normal    Data     I have personally reviewed the following data over the past 24  hrs:    10.9  \   11.3 (L)   / 215     137 105 13.6 /  98   4.5 20 (L) 0.50 (L) \     ALT: 18 AST: 42 AP: 87 TBILI: 0.4   ALB: 4.0 TOT PROTEIN: 7.5 LIPASE: 16     Trop: <6 BNP: N/A       Imaging results reviewed over the past 24 hrs:   Recent Results (from the past 24 hours)   Head CT w/o contrast    Narrative    EXAM: CT SINUS W/O CONTRAST, CT HEAD W/O CONTRAST  LOCATION: [facility]  DATE: 1/27/2025    INDICATION: Recently had sinus surgery, now having headaches.  COMPARISON: Sinus CT dated 10/30/2024.  TECHNIQUE:   1. Routine head CT without contrast. Multiplanar reformats. Dose reduction techniques were used.  2. Routine sinus CT without contrast. Multiplanar reformats. Dose reduction techniques were used.    FINDINGS:   HEAD CT: The ventricles appear normal in size and configuration. Normal brain parenchymal attenuation. No large transcortical acute or subacute infarct, acute intracranial hemorrhage, extra-axial fluid collection, or significant mass effect. Mildly   low-lying cerebellar tonsils extending approximately 2-3 mm below the level of the foramen magnum. Partially empty appearance of the sella with flattening of the pituitary tissue along the sellar floor.    The partially visualized orbits appear unremarkable. Please see separate report from sinus CT of same session for details regarding paranasal sinus findings. The mastoid and middle ear cavities appear grossly clear. No acute osseous abnormality of the   calvarium is identified.    SINUS CT:  Interval postsurgical changes status post bilateral maxillary antrostomy/uncinectomy and probable bilateral internal ethmoidectomy.    FRONTAL SINUSES: Complete soft tissue opacification of the left frontal sinus, unchanged. Slightly decreased moderate to severe mucosal thickening with aerated secretions in the right frontal sinus.    ETHMOID SINUSES: Changes of partial bilateral ethmoidectomy. There appears to be some nonspecific  hypoattenuating surgical material within the bilateral ethmoidectomy defects/upper nasal cavities (series 4 image 24, series 3 image 23). Otherwise, there   is marked soft tissue opacification of the bilateral ethmoid sinuses.    SPHENOID SINUSES: Increased, moderate to severe, soft tissue opacification of the left sphenoid sinus. Slightly decreased, near complete soft tissue opacification of the right sphenoid sinus. There appear to be some aerated secretions/layering fluid in   the bilateral sphenoid sinuses.     MAXILLARY SINUSES: Moderate to severe polypoid mucosal thickening  in the bilateral maxillary sinuses, slightly decreased on the right and increased on the left. Layering fluid/secretions in the bilateral maxillary sinuses. There is some hyperattenuating   material in the right maxillary sinus that could represent inspissated secretions or possibly blood products.    NASAL CAVITY/SKULL BASE: As above, there appears to be hypoattenuating nonspecific surgical material in the region of the ethmoidectomy defects and the upper aspects of the nasal cavities. There is soft tissue opacification of the nasal cavities   bilaterally, including the bilateral olfactory clefts. This may represent mucosal thickening and/or sinonasal polyps. Mild leftward deviation of the intact nasal septum. The cribriform plate appears to be grossly intact. The anterior clinoid processes   are not pneumatized.    PARANASAL SINUS DRAINAGE PATHWAYS:  The bilateral maxillary antrostomy defects appear be patent. There is complete soft tissue opacification/obstruction of the bilateral frontal and sphenoid sinus drainage pathways.    NON-SINUS STRUCTURES: The intraorbital soft tissue structures appear within normal limits. The bony orbits appear to be intact.      Impression    IMPRESSION:  1.  Status post bilateral maxillary antrostomy/uncinectomy and partial internal ethmoidectomy compared to the prior sinus CT. There appears to be  hypoattenuating nonspecific surgical material in the bilateral ethmoidectomy defects extending into the   upper nasal cavities. Please correlate clinically with direct inspection.  2.  There is persistent moderate to severe/severe mucosal thickening throughout the paranasal sinuses, slightly improved in some areas and slightly progressed in others, as described. There is some hyperattenuating material in the right maxillary sinus   that could represent inspissated secretions or possibly postsurgical blood products given the patient's history of recent surgery.  3.  Persistent abnormal soft tissue opacification of the upper nasal cavities including the olfactory clefts, which may be due to mucosal thickening and/or sinonasal polyps.  4.  The bony orbits appear intact. The intraorbital soft tissue structures appear unremarkable. No CT findings of acute intracranial process are identified. The cribriform plate appears to be grossly intact. No pneumocephalus.  5.  Partially empty appearance of the sella, which can be incidental, but has also been described in patients with idiopathic intracranial hypertension. Please correlate clinically.  6.  Mildly low-lying cerebellar tonsils.   CT Sinus w/o Contrast    Narrative    EXAM: CT SINUS W/O CONTRAST, CT HEAD W/O CONTRAST  LOCATION: [facility]  DATE: 1/27/2025    INDICATION: Recently had sinus surgery, now having headaches.  COMPARISON: Sinus CT dated 10/30/2024.  TECHNIQUE:   1. Routine head CT without contrast. Multiplanar reformats. Dose reduction techniques were used.  2. Routine sinus CT without contrast. Multiplanar reformats. Dose reduction techniques were used.    FINDINGS:   HEAD CT: The ventricles appear normal in size and configuration. Normal brain parenchymal attenuation. No large transcortical acute or subacute infarct, acute intracranial hemorrhage, extra-axial fluid collection, or significant mass effect. Mildly   low-lying  cerebellar tonsils extending approximately 2-3 mm below the level of the foramen magnum. Partially empty appearance of the sella with flattening of the pituitary tissue along the sellar floor.    The partially visualized orbits appear unremarkable. Please see separate report from sinus CT of same session for details regarding paranasal sinus findings. The mastoid and middle ear cavities appear grossly clear. No acute osseous abnormality of the   calvarium is identified.    SINUS CT:  Interval postsurgical changes status post bilateral maxillary antrostomy/uncinectomy and probable bilateral internal ethmoidectomy.    FRONTAL SINUSES: Complete soft tissue opacification of the left frontal sinus, unchanged. Slightly decreased moderate to severe mucosal thickening with aerated secretions in the right frontal sinus.    ETHMOID SINUSES: Changes of partial bilateral ethmoidectomy. There appears to be some nonspecific hypoattenuating surgical material within the bilateral ethmoidectomy defects/upper nasal cavities (series 4 image 24, series 3 image 23). Otherwise, there   is marked soft tissue opacification of the bilateral ethmoid sinuses.    SPHENOID SINUSES: Increased, moderate to severe, soft tissue opacification of the left sphenoid sinus. Slightly decreased, near complete soft tissue opacification of the right sphenoid sinus. There appear to be some aerated secretions/layering fluid in   the bilateral sphenoid sinuses.     MAXILLARY SINUSES: Moderate to severe polypoid mucosal thickening  in the bilateral maxillary sinuses, slightly decreased on the right and increased on the left. Layering fluid/secretions in the bilateral maxillary sinuses. There is some hyperattenuating   material in the right maxillary sinus that could represent inspissated secretions or possibly blood products.    NASAL CAVITY/SKULL BASE: As above, there appears to be hypoattenuating nonspecific surgical material in the region of the  ethmoidectomy defects and the upper aspects of the nasal cavities. There is soft tissue opacification of the nasal cavities   bilaterally, including the bilateral olfactory clefts. This may represent mucosal thickening and/or sinonasal polyps. Mild leftward deviation of the intact nasal septum. The cribriform plate appears to be grossly intact. The anterior clinoid processes   are not pneumatized.    PARANASAL SINUS DRAINAGE PATHWAYS:  The bilateral maxillary antrostomy defects appear be patent. There is complete soft tissue opacification/obstruction of the bilateral frontal and sphenoid sinus drainage pathways.    NON-SINUS STRUCTURES: The intraorbital soft tissue structures appear within normal limits. The bony orbits appear to be intact.      Impression    IMPRESSION:  1.  Status post bilateral maxillary antrostomy/uncinectomy and partial internal ethmoidectomy compared to the prior sinus CT. There appears to be hypoattenuating nonspecific surgical material in the bilateral ethmoidectomy defects extending into the   upper nasal cavities. Please correlate clinically with direct inspection.  2.  There is persistent moderate to severe/severe mucosal thickening throughout the paranasal sinuses, slightly improved in some areas and slightly progressed in others, as described. There is some hyperattenuating material in the right maxillary sinus   that could represent inspissated secretions or possibly postsurgical blood products given the patient's history of recent surgery.  3.  Persistent abnormal soft tissue opacification of the upper nasal cavities including the olfactory clefts, which may be due to mucosal thickening and/or sinonasal polyps.  4.  The bony orbits appear intact. The intraorbital soft tissue structures appear unremarkable. No CT findings of acute intracranial process are identified. The cribriform plate appears to be grossly intact. No pneumocephalus.  5.  Partially empty appearance of the sella,  which can be incidental, but has also been described in patients with idiopathic intracranial hypertension. Please correlate clinically.  6.  Mildly low-lying cerebellar tonsils.   CT Chest Pulmonary Embolism w Contrast    Narrative    EXAM: CT CHEST PULMONARY EMBOLISM W CONTRAST  LOCATION: [facility]  DATE: 1/27/2025    INDICATION: SOB, hypxoia  COMPARISON: 10/26/2023  TECHNIQUE: CT chest pulmonary angiogram during arterial phase injection of IV contrast. Multiplanar reformats and MIP reconstructions were performed. Dose reduction techniques were used.   CONTRAST: 100 mL [id number]    FINDINGS:  ANGIOGRAM CHEST: Pulmonary arteries are normal caliber and negative for pulmonary emboli. Thoracic aorta is negative for dissection. No CT evidence of right heart strain.    LUNGS AND PLEURA: Moderate scattered mucous plugging throughout both lungs slightly more prominent in the lower lungs. No bronchiectasis. No focal infiltrates. Minimal inflammatory change in the lingula with some atelectasis and tiny subpleural 4 mm   nodule new since 10/26/2023 likely postinflammatory. No follow-up needed for this.    MEDIASTINUM/AXILLAE: Mild bilateral enlarged lymph nodes are mildly enlarged left axillary lymph nodes as minimally larger than prior study now measures 1.6 x 1.3 cm previously 1.3 x 1.0 cm.    CORONARY ARTERY CALCIFICATION: None.    UPPER ABDOMEN: Normal.    MUSCULOSKELETAL: Normal.      Impression    IMPRESSION:  1.  Negative for pulmonary emboli and negative for dissections.    2.  Moderate scattered areas of mucous plugging throughout both lungs. Nothing for pneumonia.    3.  Mildly enlarged bilateral hilar nodes most likely reactive.    4.  Mildly enlarged left axillary lymph node 1.6 x 1.3 cm minimally larger than 10/26/2023. Most likely reactive given this little change.

## 2025-01-27 NOTE — ED TRIAGE NOTES
Pt had a nasal area surgery and was prescribed meds to take at home.  She had the expected pain in the nasal area but since yesterday she has had left side headache     Triage Assessment (Adult)       Row Name 01/27/25 0919          Triage Assessment    Airway WDL WDL        Respiratory WDL    Respiratory WDL WDL        Skin Circulation/Temperature WDL    Skin Circulation/Temperature WDL WDL        Cardiac WDL    Cardiac WDL WDL        Peripheral/Neurovascular WDL    Peripheral Neurovascular WDL WDL        Cognitive/Neuro/Behavioral WDL    Cognitive/Neuro/Behavioral WDL WDL

## 2025-01-27 NOTE — ED PROVIDER NOTES
Emergency Department Midlevel Supervisory Note     I had a face to face encounter with this patient seen by the Advanced Practice Provider (MARIA A). I personally made/approved the management plan and take responsibility for the patient management. I personally saw patient and performed a substantive portion of the visit including all aspects of the medical decision making     ED Course:  3:22 PM  Sydney Smith PA-C staffed patient with me. I agree with their assessment and plan of management, and I will see the patient.  3:40 PM  I met with the patient to introduce myself, gather additional history, perform my initial exam, and discuss the plan.     Brief HPI:     Claire Mora is a 34 year old female who presents for evaluation of headache after sinus surgery on 1/24/24 and also beginnings of increasing sob en route here.  She notes in the winter that her wheezing will get worse with cold exposure but indicates it is never as bad as it was en route here and then thinks it did worsen after the toradol (has worsened in the past when on ibuprofen).  She has been sneezing frequently since the surgery and was told this is from nerve irritation.  She is supposed to continue on [place] nasal spray and per my review of her discharge instructions this is supposed to be every 2 hours scheduled.  Then, she was also discharged on Oxy metolazone for 3 days which she would have finished yesterday.  Additionally, she was discharged on cephalexin for antibiotic to help to prevent infection.  She has not noticed a fever.  She did take a dose of prednisone at some point over the last week and states that it was for worsened seasonal allergies but I do not see that on her discharge summary from the surgical center.    Brief Physical Exam: /60   Pulse 97   Temp 98  F (36.7  C) (Oral)   Resp 20   Wt 79.8 kg (176 lb)   LMP 01/06/2025   SpO2 95%   BMI 31.18 kg/m    Constitutional:   in nad sitting back in bed, nonlabored  breathing   HENT:  Normocephalic, posterior pharynx wnl, mucous membranes moist and dark pink .  Frequent sneezing with clear blood tinged mucous   Eyes:  PERRL, EOMI, Conjunctiva normal, No discharge, no scleral icterus.  Respiratory:  Breathing easily, no wheezing or accessory muscle usage but does have occasional crackles3  Cardiovascular:  rrr nl s1s2 0 murmurs, rubs, or gallops.  Peripheral pulses dp, pt, and radial are wnl.  no peripheral edema   GI:  Bowel sounds normal, Soft, No tenderness, No flank tenderness, nondistended.  :No CVA tenderness.   Musculoskeletal:  Moves all extremities.  No erythematous or swollen major joints,   Integument:  normal color on exposed skin but seen fully clothed  Neurologic:  Alert & oriented x 3, Normal motor function, Normal sensory function, No focal deficits noted. Normal speech.  Psychiatric:  Affect normal, Judgment normal, Mood normal.     MDM:  Initially treatment here was for headache related to her sinus surgery but again no evidence of infection.  However, when she started having recurrent hypoxia here with good waveform, CT scan was done with contrast to rule out pulmonary embolism versus pneumonia.  That CT shows significant mucous plugging.    It was at this point that I was consulted.  Unclear the source of the hypoxia except that there is significant mucus plugging and so suspect that this is causing her asthma to be less well-controlled.  With the recurrent hypoxia here I think we do need to discuss at least an observation stay in the hospital.  Whether or not the Toradol worsened her symptoms is unclear as she was already getting worse before the medication was given and certainly shows no evidence of acute respiratory distress or acute allergic reaction at this time.  The significant amount of mucus in her lungs may be from drainage from the surgery.  She is already on cephalexin and is not having a fever but given the recent surgery and again mucous  plugging with hypoxia since surgery will discuss with hospitalist/admitting physician if they would like her to be on additional macrolide type antibiotic for additional coverage.  Otherwise, will do ABG to evaluate for respiratory acidosis and the degree of hypoxia.  Then, consider BiPAP or high flow oxygen depending on those results if needed for additional control of her symptoms.  For now she is in no distress breathing easily so we will continue nasal cannula oxygen as needed and steroids with breathing treatments.    Patient admitted to resident hospital team.  They will see her and determine whether or not to add antibiotics.  No respiratory acidosis but her po2 is a bit low.  Continue with plan for observation admission to continue to monitor.  No bipap at this time as in no distress.       1. Acute post-operative pain    2. Acid reflux        Labs and Imaging:  Results for orders placed or performed during the hospital encounter of 01/27/25   CT Sinus w/o Contrast    Impression    IMPRESSION:  1.  Status post bilateral maxillary antrostomy/uncinectomy and partial internal ethmoidectomy compared to the prior sinus CT. There appears to be hypoattenuating nonspecific surgical material in the bilateral ethmoidectomy defects extending into the   upper nasal cavities. Please correlate clinically with direct inspection.  2.  There is persistent moderate to severe/severe mucosal thickening throughout the paranasal sinuses, slightly improved in some areas and slightly progressed in others, as described. There is some hyperattenuating material in the right maxillary sinus   that could represent inspissated secretions or possibly postsurgical blood products given the patient's history of recent surgery.  3.  Persistent abnormal soft tissue opacification of the upper nasal cavities including the olfactory clefts, which may be due to mucosal thickening and/or sinonasal polyps.  4.  The bony orbits appear intact. The  intraorbital soft tissue structures appear unremarkable. No CT findings of acute intracranial process are identified. The cribriform plate appears to be grossly intact. No pneumocephalus.  5.  Partially empty appearance of the sella, which can be incidental, but has also been described in patients with idiopathic intracranial hypertension. Please correlate clinically.  6.  Mildly low-lying cerebellar tonsils.   Head CT w/o contrast    Impression    IMPRESSION:  1.  Status post bilateral maxillary antrostomy/uncinectomy and partial internal ethmoidectomy compared to the prior sinus CT. There appears to be hypoattenuating nonspecific surgical material in the bilateral ethmoidectomy defects extending into the   upper nasal cavities. Please correlate clinically with direct inspection.  2.  There is persistent moderate to severe/severe mucosal thickening throughout the paranasal sinuses, slightly improved in some areas and slightly progressed in others, as described. There is some hyperattenuating material in the right maxillary sinus   that could represent inspissated secretions or possibly postsurgical blood products given the patient's history of recent surgery.  3.  Persistent abnormal soft tissue opacification of the upper nasal cavities including the olfactory clefts, which may be due to mucosal thickening and/or sinonasal polyps.  4.  The bony orbits appear intact. The intraorbital soft tissue structures appear unremarkable. No CT findings of acute intracranial process are identified. The cribriform plate appears to be grossly intact. No pneumocephalus.  5.  Partially empty appearance of the sella, which can be incidental, but has also been described in patients with idiopathic intracranial hypertension. Please correlate clinically.  6.  Mildly low-lying cerebellar tonsils.   CT Chest Pulmonary Embolism w Contrast    Impression    IMPRESSION:  1.  Negative for pulmonary emboli and negative for dissections.    2.   Moderate scattered areas of mucous plugging throughout both lungs. Nothing for pneumonia.    3.  Mildly enlarged bilateral hilar nodes most likely reactive.    4.  Mildly enlarged left axillary lymph node 1.6 x 1.3 cm minimally larger than 10/26/2023. Most likely reactive given this little change.   Basic metabolic panel   Result Value Ref Range    Sodium 137 135 - 145 mmol/L    Potassium 4.5 3.4 - 5.3 mmol/L    Chloride 105 98 - 107 mmol/L    Carbon Dioxide (CO2) 20 (L) 22 - 29 mmol/L    Anion Gap 12 7 - 15 mmol/L    Urea Nitrogen 13.6 6.0 - 20.0 mg/dL    Creatinine 0.50 (L) 0.51 - 0.95 mg/dL    GFR Estimate >90 >60 mL/min/1.73m2    Calcium 8.5 (L) 8.8 - 10.4 mg/dL    Glucose 98 70 - 99 mg/dL   Hepatic function panel   Result Value Ref Range    Protein Total 7.5 6.4 - 8.3 g/dL    Albumin 4.0 3.5 - 5.2 g/dL    Bilirubin Total 0.4 <=1.2 mg/dL    Alkaline Phosphatase 87 40 - 150 U/L    AST 42 0 - 45 U/L    ALT 18 0 - 50 U/L    Bilirubin Direct     Result Value Ref Range    Lipase 16 13 - 60 U/L   Result Value Ref Range    Troponin T, High Sensitivity <6 <=14 ng/L   CBC with platelets and differential   Result Value Ref Range    WBC Count 10.9 4.0 - 11.0 10e3/uL    RBC Count 5.16 3.80 - 5.20 10e6/uL    Hemoglobin 11.3 (L) 11.7 - 15.7 g/dL    Hematocrit 36.8 35.0 - 47.0 %    MCV 71 (L) 78 - 100 fL    MCH 21.9 (L) 26.5 - 33.0 pg    MCHC 30.7 (L) 31.5 - 36.5 g/dL    RDW 15.9 (H) 10.0 - 15.0 %    Platelet Count 215 150 - 450 10e3/uL    % Neutrophils 72 %    % Lymphocytes 21 %    % Monocytes 4 %    % Eosinophils 3 %    % Basophils 0 %    % Immature Granulocytes 0 %    NRBCs per 100 WBC 0 <1 /100    Absolute Neutrophils 7.8 1.6 - 8.3 10e3/uL    Absolute Lymphocytes 2.3 0.8 - 5.3 10e3/uL    Absolute Monocytes 0.5 0.0 - 1.3 10e3/uL    Absolute Eosinophils 0.3 0.0 - 0.7 10e3/uL    Absolute Basophils 0.0 0.0 - 0.2 10e3/uL    Absolute Immature Granulocytes 0.0 <=0.4 10e3/uL    Absolute NRBCs 0.0 10e3/uL   ECG 12-LEAD WITH MUSE  ([facility])   Result Value Ref Range    Systolic Blood Pressure 111 mmHg    Diastolic Blood Pressure 64 mmHg    Ventricular Rate 78 BPM    Atrial Rate 78 BPM    MD Interval 142 ms    QRS Duration 78 ms     ms    QTc 426 ms    P Axis 71 degrees    R AXIS 63 degrees    T Axis 57 degrees    Interpretation ECG       Sinus rhythm  Normal ECG  When compared with ECG of 25-Oct-2023 22:52,  Vent. rate has decreased by  54 bpm  Confirmed by SEE ED PROVIDER NOTE FOR, ECG INTERPRETATION ([id number]),  Sara Barbosa ([id number]) on 1/27/2025 11:12:19 AM         I have reviewed the relevant laboratory studies above.    I independently interpreted the following imaging study(s):   Ct chest    EKG: I reviewed and independently interpreted the patient's EKG, with comments made as listed below. Please see scanned EKG for full report.   Done prior to my arrival for shift, but reviewed on my seeing the patient  Performed at:  1046  Impression:  nsr rate of 78, normal EKG.  Pr 142ms, qrs 78ms, qtc 426ms, prt axes 71 63 57.  Compared to 10/25/23 hr has improved and previous nonspecific st findings have normalized.    Procedures:  I was present for the key portions of procedures documented in MARIA A/midlevel note, see midlevel note for further details.    Alisha Suh MD  [facility] EMERGENCY ROOM  [address] [place] [id number] [phone]       Alisha Suh MD  01/27/25 [id number]       Alisha Suh MD  01/27/25 2033

## 2025-01-27 NOTE — ED PROVIDER NOTES
EMERGENCY DEPARTMENT ENCOUNTER      NAME: Claire Mora  AGE: 34 year old female  YOB: 1990  MRN: [id number]  EVALUATION DATE & TIME: No admission date for patient encounter.    PCP: [facility]    ED PROVIDER: Sydney Smith PA-C      Chief Complaint   Patient presents with    Headache         FINAL IMPRESSION:  1. Acute post-operative pain    2. Acid reflux    3. Hypoxia    4. History of asthma          ED COURSE & MEDICAL DECISION MAKING:    Pertinent Labs & Imaging studies reviewed. (See chart for details)    34 year old female presents to the Emergency Department for evaluation of headache and heartburn.     Physical exam is remarkable for a generally well appearing female who is in no acute distress. Heart and lung sounds are clear diffusely throughout. She has active bowel sounds, abdomen is soft with no tenderness to palpation, rebound, or guarding. Oropharynx is unremarkable. Small amount of dried blood in the bilateral nares, turbinates appear erythematous without significant swelling. TMs retracted bilaterally. Pupils are equal and reactive, Cns 3-12 appear grossly intact. Vital signs are stable and she is afebrile.     CBC remarkable for anemia with hemoglobin of 11.3, likely chronic. CBC unremarkable. BMP unremarkable with no significant electrolyte derangements, normal kidney function. Lipase within normal limits. Hepatic function panel unremarkable. Troponin is negative, EKG without acute ischemic changes. CT head and sinuses shows expected post-operative changes).     Patient initially had stable vital signs-around 12:00 PM, she complained to nursing staff about shortness of breath and oxygen was noted to be around 92%. She noted at that time she started feeling short of breath this morning but admits she did not take her normal asthma inhalers. I then went to discharge the patient at around 12:49 PM and she noted increased shortness of breath, noted to be hypoxic  on the monitor at 84% with good wave form. She did not have abnormal breath sounds at that time but I ordered a Duo-neb and CTA of her chest. CTA shows mucous plugging without evidence of pneumonia or PE. She continued to have episodes of hypoxia and required oxygen by oxymask. ABG obtained which shows hypocarbia but otherwise unremarkable so etiology of hypoxia is not entirely clear.  Of note, the patient states that ibuprofen makes her asthma symptoms worse and she was given toradol here but was complaining of SOB prior to administration. Flu and covid swab is pending.     I discussed the patient's CT sinuses with her ENT doctor Dr. Yan who has no acute concerns and state her CT findings are reassuring. However, given her hypoxia with no known history of oxygen requirement, plan to admit to the hospital. She was given tylenol, toradol, reglan, and benadryl here to help with her headache with improvement. IV Solumedrol given to treat possible asthma exacerbation. She is on keflex currently post-op, defer additional antibiotics at hospitalist recommendation at this time. Patient agreeable with this treatment plan. Care discussed with staff physician Dr. Alisha Suh.       Medical Decision Making  Obtained supplemental history:Supplemental history obtained?: Family Member/Significant Other  Reviewed external records: External records reviewed?: Outpatient Record: Surgical procedure 01/24/25  Care impacted by chronic illness:Chronic Lung Disease  Care significantly affected by social determinants of health:Access to Medical Care  Did you consider but not order tests?: Work up considered but not performed and documented in chart, if applicable  Did you interpret images independently?: Independent interpretation of ECG and images noted in documentation, when applicable.  Consultation discussion with other provider:Did you involve another provider (consultant, , pharmacy, etc.)?: I discussed the care with another  health care provider, see documentation for details.  Admit.  CT Pulmonary Angiogram:Patient is moderate to high risk for PE.      ED Course   9:45 AM Performed my initial history and physical exam. Discussed workup in the emergency department, management of symptoms, and likely disposition.   12:24 PM Discussed with Dr. Ramy Yan, patient's ENT surgeon.   12:49 PM Updated with test results. Patient complaining of SOB, mild wheezing noted and O2 in upper 80s. She states she did not take her asthma medications this morning.  2:48 PM Called radiology to determine status of PE run.   3:11 PM Staffed with Dr. Alisha Suh    At the conclusion of the encounter I discussed the results of all of the tests and the disposition. The questions were answered. The patient or family acknowledged understanding and was agreeable with the care plan.     Voice recognition software was used in the creation of this note. Any grammatical or nonsensical errors are due to inherent errors with the software and are not the intention of the writer.     MEDICATIONS GIVEN IN THE EMERGENCY:  Medications   sodium chloride (OCEAN) 0.65 % nasal spray 1 spray (has no administration in time range)   sodium chloride (OCEAN) 0.65 % nasal spray 1 spray (has no administration in time range)   acetaminophen (TYLENOL) tablet 975 mg (975 mg Oral $Given 1/27/25 1012)   ketorolac (TORADOL) injection 15 mg (15 mg Intravenous $Given 1/27/25 1224)   metoclopramide (REGLAN) injection 10 mg (10 mg Intravenous $Given 1/27/25 1222)   diphenhydrAMINE (BENADRYL) injection 25 mg (25 mg Intravenous $Given 1/27/25 1222)   ipratropium - albuterol 0.5 mg/2.5 mg/3 mL (DUONEB) neb solution 3 mL (3 mLs Nebulization $Given 1/27/25 1302)   iopamidol (ISOVUE-370) solution 100 mL (100 mLs Intravenous $Given 1/27/25 1327)   ipratropium - albuterol 0.5 mg/2.5 mg/3 mL (DUONEB) neb solution 3 mL (3 mLs Nebulization $Given 1/27/25 1418)   methylPREDNISolone Na Suc (solu-MEDROL)  "injection 125 mg (125 mg Intravenous $Given 1/27/25 9702)       NEW PRESCRIPTIONS STARTED AT TODAY'S ER VISIT  New Prescriptions    No medications on file            =================================================================    HPI    Patient information was obtained from: Patient    Use of : Friend - declined professional         Claire Mora is a 34 year old female who presents to the ED via walk-in with family member for evaluation of a headache.     The patient had a sinus procedure with  Dr. Ramy Dickson on 01/24/25 under general anesthesia.     The patient reports a headache around the back of her head and top of her head for the last two days. She also has a headache around her eyes and behind her nose. She rates the headache as 7/10 and states it is not sharp. She has been taking tylenol and dilaudid with some improvement, none taken today. She also complains of \"heart burn\" in her epigastrium and cramping abdominal pain. She has had this before but it is worse the last two days. She had some vomiting over the last two days, yesterday was last emesis. She notes she has not had a BM since surgery, endorses some mild cramping abdominal pain. She has not taken any medications for heartburn or constipation.     She denies vision changes, hearing changes, fevers, chills, sob, epistaxis, or purulent discharge from the nose.       REVIEW OF SYSTEMS   Review of Systems   Constitutional:  Negative for chills and fever.   HENT:  Positive for sinus pain. Negative for nosebleeds and sore throat.    Eyes:  Negative for visual disturbance.   Respiratory:  Negative for cough and shortness of breath.    Cardiovascular:  Negative for chest pain.   Gastrointestinal:  Positive for constipation, nausea and vomiting.   Neurological:  Positive for headaches.       All other systems reviewed and are negative unless noted in HPI.      PAST MEDICAL HISTORY:  No past medical history on file.    PAST " SURGICAL HISTORY:  No past surgical history on file.    CURRENT MEDICATIONS:    ADVAIR -21 MCG/ACT inhaler  albuterol (PROAIR HFA/PROVENTIL HFA/VENTOLIN HFA) 108 (90 Base) MCG/ACT inhaler  azelastine (ASTELIN) 0.1 % nasal spray  benzonatate (TESSALON) 100 MG capsule  ferrous sulfate (FEROSUL) 325 (65 Fe) MG tablet  fluticasone (FLONASE) 50 MCG/ACT nasal spray        ALLERGIES:  Allergies   Allergen Reactions    Ibuprofen Cough     Trigger asthma, pt reported. d       FAMILY HISTORY:  No family history on file.    SOCIAL HISTORY:   Social History     Socioeconomic History    Marital status: Single   Tobacco Use    Smoking status: Never    Smokeless tobacco: Never     Social Drivers of Health     Food Insecurity: No Food Insecurity (8/20/2024)    Received from [facility]    Hunger Vital Sign     Worried About Running Out of Food in the Last Year: Never true     Ran Out of Food in the Last Year: Never true   Transportation Needs: No Transportation Needs (8/20/2024)    [facility]    [facility]     In the past 12 months, has lack of transportation kept you from medical appointments or from getting medications?: No     In the past 12 months, has lack of transportation kept you from meetings, work, or from getting things needed for daily living?: No    [facility], [employer]    Social Connections   Housing Stability: High Risk (8/20/2024)    Received from [facility]    Housing Stability Vital Sign     Unable to Pay for Housing in the Last Year: Yes     Homeless in the Last Year: Yes       VITALS:  Patient Vitals for the past 24 hrs:   BP Temp Temp src Pulse Resp SpO2 Weight   01/27/25 1533 -- -- -- -- 20 95 % 79.8 kg (176 lb)   01/27/25 1515 -- -- -- -- 18 93 % --   01/27/25 1500 112/60 -- -- 97 25 96 % --   01/27/25 1452 -- -- -- 105 23 96 % --   01/27/25 1437 -- -- -- (!) 115 29 96 % --    01/27/25 1434 116/56 -- -- (!) 107 22 93 % --   01/27/25 1422 -- -- -- 103 16 95 % --   01/27/25 1418 -- -- -- (!) 111 (!) 46 93 % --   01/27/25 1407 -- -- -- 105 24 92 % --   01/27/25 1406 105/60 -- -- 103 17 95 % --   01/27/25 1351 105/63 -- -- 101 (!) 66 (!) 89 % --   01/27/25 1321 108/57 -- -- (!) 115 -- -- --   01/27/25 1318 -- -- -- -- -- (!) 85 % --   01/27/25 1310 -- -- -- -- -- (!) 91 % --   01/27/25 1307 -- -- -- (!) 115 -- (!) 90 % --   01/27/25 1306 110/64 -- -- -- -- (!) 84 % --   01/27/25 1230 115/71 -- -- 78 21 94 % --   01/27/25 1215 114/69 -- -- 76 21 95 % --   01/27/25 1200 111/72 -- -- 70 18 93 % --   01/27/25 1152 -- -- -- 73 27 94 % --   01/27/25 1137 -- -- -- 78 18 94 % --   01/27/25 1122 103/71 -- -- 80 20 93 % --   01/27/25 1107 -- -- -- 79 21 95 % --   01/27/25 1104 117/72 -- -- 90 (!) 32 94 % --   01/27/25 1052 -- -- -- 77 18 96 % --   01/27/25 1049 111/64 -- -- 76 19 95 % --   01/27/25 1037 -- -- -- 78 -- 95 % --   01/27/25 1034 110/57 -- -- 78 -- 96 % --   01/27/25 0920 116/65 98  F (36.7  C) Oral 87 16 96 % 79.8 kg (176 lb)       PHYSICAL EXAM    VITAL SIGNS: /60   Pulse 97   Temp 98  F (36.7  C) (Oral)   Resp 20   Wt 79.8 kg (176 lb)   LMP 01/06/2025   SpO2 95%   BMI 31.18 kg/m    General Appearance: Alert, cooperative, normal speech and facial symmetry, appears stated age, the patient does not appear in distress  Head:  Normocephalic, without obvious abnormality, atraumatic  Eyes: Conjunctiva/corneas clear, EOM's intact, no nystagmus, PERRL  ENT: Dried blood in bilateral nares; turbinates appear erythematous without significant swelling; Tms retracted bilaterally; Lips, mucosa, and tongue normal; teeth and gums normal, no pharyngeal inflammation, no dysphonia or difficulty swallowing, membranes are moist without pallor  Cardio:  Regular rate and rhythm, S1 and S2 normal, no murmur, rub    or gallop, 2+ pulses symmetric in all extremities  Pulm:  Clear to auscultation  bilaterally, respirations unlabored with no accessory muscle use  Abdomen:  Active bowel sounds in all quadrants; abdomen is soft, non-distended with no tenderness to palpation, rebound tenderness, or guarding.   Extremities: Moves all extremities  Skin: Skin is warm and dry, no rashes or wounds  Neuro: Patient is awake, alert, and responsive to voice. No gross motor weaknesses or sensory loss; moves all extremities. CNS 3-12 appear grossly intact.    LAB:  All pertinent labs reviewed and interpreted.  Labs Ordered and Resulted from Time of ED Arrival to Time of ED Departure   BASIC METABOLIC PANEL - Abnormal       Result Value    Sodium 137      Potassium 4.5      Chloride 105      Carbon Dioxide (CO2) 20 (*)     Anion Gap 12      Urea Nitrogen 13.6      Creatinine 0.50 (*)     GFR Estimate >90      Calcium 8.5 (*)     Glucose 98     CBC WITH PLATELETS AND DIFFERENTIAL - Abnormal    WBC Count 10.9      RBC Count 5.16      Hemoglobin 11.3 (*)     Hematocrit 36.8      MCV 71 (*)     MCH 21.9 (*)     MCHC 30.7 (*)     RDW 15.9 (*)     Platelet Count 215      % Neutrophils 72      % Lymphocytes 21      % Monocytes 4      % Eosinophils 3      % Basophils 0      % Immature Granulocytes 0      NRBCs per 100 WBC 0      Absolute Neutrophils 7.8      Absolute Lymphocytes 2.3      Absolute Monocytes 0.5      Absolute Eosinophils 0.3      Absolute Basophils 0.0      Absolute Immature Granulocytes 0.0      Absolute NRBCs 0.0     BLOOD GAS ARTERIAL - Abnormal    pH Arterial 7.42      pCO2 Arterial 32 (*)     pO2 Arterial 64 (*)     FIO2 21      Bicarbonate Arterial 21      Base Excess/Deficit Arterial -2.7      Oxyhemoglobin Arterial 92      O2 Sat, Arterial 93.1 (*)     Peep 0     LIPASE - Normal    Lipase 16     TROPONIN T, HIGH SENSITIVITY - Normal    Troponin T, High Sensitivity <6     HEPATIC FUNCTION PANEL    Protein Total 7.5      Albumin 4.0      Bilirubin Total 0.4      Alkaline Phosphatase 87      AST 42      ALT 18       Bilirubin Direct       INFLUENZA A/B, RSV AND SARS-COV2 PCR   RESPIRATORY PANEL PCR       RADIOLOGY:  Reviewed all pertinent imaging. Please see official radiology report.  CT Chest Pulmonary Embolism w Contrast   Final Result   IMPRESSION:   1.  Negative for pulmonary emboli and negative for dissections.      2.  Moderate scattered areas of mucous plugging throughout both lungs. Nothing for pneumonia.      3.  Mildly enlarged bilateral hilar nodes most likely reactive.      4.  Mildly enlarged left axillary lymph node 1.6 x 1.3 cm minimally larger than 10/26/2023. Most likely reactive given this little change.      CT Sinus w/o Contrast   Final Result   IMPRESSION:   1.  Status post bilateral maxillary antrostomy/uncinectomy and partial internal ethmoidectomy compared to the prior sinus CT. There appears to be hypoattenuating nonspecific surgical material in the bilateral ethmoidectomy defects extending into the    upper nasal cavities. Please correlate clinically with direct inspection.   2.  There is persistent moderate to severe/severe mucosal thickening throughout the paranasal sinuses, slightly improved in some areas and slightly progressed in others, as described. There is some hyperattenuating material in the right maxillary sinus    that could represent inspissated secretions or possibly postsurgical blood products given the patient's history of recent surgery.   3.  Persistent abnormal soft tissue opacification of the upper nasal cavities including the olfactory clefts, which may be due to mucosal thickening and/or sinonasal polyps.   4.  The bony orbits appear intact. The intraorbital soft tissue structures appear unremarkable. No CT findings of acute intracranial process are identified. The cribriform plate appears to be grossly intact. No pneumocephalus.   5.  Partially empty appearance of the sella, which can be incidental, but has also been described in patients with idiopathic intracranial  hypertension. Please correlate clinically.   6.  Mildly low-lying cerebellar tonsils.      Head CT w/o contrast   Final Result   IMPRESSION:   1.  Status post bilateral maxillary antrostomy/uncinectomy and partial internal ethmoidectomy compared to the prior sinus CT. There appears to be hypoattenuating nonspecific surgical material in the bilateral ethmoidectomy defects extending into the    upper nasal cavities. Please correlate clinically with direct inspection.   2.  There is persistent moderate to severe/severe mucosal thickening throughout the paranasal sinuses, slightly improved in some areas and slightly progressed in others, as described. There is some hyperattenuating material in the right maxillary sinus    that could represent inspissated secretions or possibly postsurgical blood products given the patient's history of recent surgery.   3.  Persistent abnormal soft tissue opacification of the upper nasal cavities including the olfactory clefts, which may be due to mucosal thickening and/or sinonasal polyps.   4.  The bony orbits appear intact. The intraorbital soft tissue structures appear unremarkable. No CT findings of acute intracranial process are identified. The cribriform plate appears to be grossly intact. No pneumocephalus.   5.  Partially empty appearance of the sella, which can be incidental, but has also been described in patients with idiopathic intracranial hypertension. Please correlate clinically.   6.  Mildly low-lying cerebellar tonsils.          EKG:    Performed at: 1046    I have independently reviewed and interpreted the EKG, along with the final read. EKG also reviewed by Dr. Iram Peguero.    Ventricular rate 78 bpm  KS interval 142 ms  QRS duration 78 ms  QT/QTc 374/426 ms  P-R-T axes 71 63 57    Impression: NSR      Sydney Smith PA-C  Emergency Medicine  [facility] EMERGENCY ROOM  [address]  [address]  [phone]  Dept: [phone]       Sydney Smith PA-C  01/27/25 1640

## 2025-01-27 NOTE — MEDICATION SCRIBE - ADMISSION MEDICATION HISTORY
Medication Scribe Admission Medication History    Admission medication history is complete. The information provided in this note is only as accurate as the sources available at the time of the update.    Information Source(s): Patient, [facility] records, and [facility]    Pertinent Information: Patient recently had a sinus surgery 1/24/2025. She was prescribed cephalexin 250 mg as 1 TID x 7D for surgical infection prophylaxis. She has not started taking this medication.     The patient was also prescribed amoxcillin 500 mg x 2 BID x 14 days, clarithromycin 500 mg BID x 14 days, and omeprazole 20 mg BID for 14 days for an H. Pylori infection. Again, has not started taking these medications yet. Based on the fill dates the amoxicillin/clarithromycin should have been taken on 1/13 - 1/27/2025 if they had been started. Confirmed multiple times (3) throughout the interview that the patient has not been on any antibiotics in the last two weeks and is not currently taking any today.     Patient reported no medications today PTA, but did last take hydromorphone two days ago. Acetaminophen and ondansetron were taken yesterday at bedtime. No Advair since Thursday PM.     Patient reported that she does not have her inhalers with today for inpatient use and would be agreeable for us to provide her inhaled medications while she is here.     Changes made to PTA medication list:  Added:   Acetaminophen 325 mg  Cetirizine 10 mg  Amoxicillin 500 mg  Cephalexin 250 mg  Sodium chloride 0.65% nasal spray  Ondansetron 4 mg HCl  Omeprazole 20 mg  Hydromorphone 2 mg  Clarithromycin 500 mg  Deleted:   Azelastine 0.1% nasal spray - not using, 5/2024  Benzonatate 100 mg - not taking  Ferrous sulfate 325 mg - not taking  Changed: None    Allergies reviewed with patient and updates made in EHR: yes    Medication History Completed By: Yobany Alonso 1/27/2025 5:45 PM    PTA Med List   Medication Sig Last  Dose/Taking    acetaminophen (TYLENOL) 325 MG tablet Take 325 mg by mouth every 6 hours. 1/26/2025 Bedtime    ADVAIR -21 MCG/ACT inhaler Inhale 2 puffs into the lungs 2 times daily Past Week Bedtime    albuterol (PROAIR HFA/PROVENTIL HFA/VENTOLIN HFA) 108 (90 Base) MCG/ACT inhaler Inhale 2 puffs into the lungs every 6 hours as needed for shortness of breath 1/26/2025 Evening    cetirizine (ZYRTEC) 10 MG tablet Take 10 mg by mouth daily. Past Week Evening    fluticasone (FLONASE) 50 MCG/ACT nasal spray Spray 2 sprays into both nostrils as needed for allergies Past Month    HYDROmorphone (DILAUDID) 2 MG tablet Take 2-4 mg by mouth every 6 hours as needed for pain. Past Week    ondansetron (ZOFRAN) 4 MG tablet Take 4 mg by mouth 3 times daily as needed for nausea. 1/26/2025    SODIUM CHLORIDE 0.65 % nasal spray Spray 1 spray in nostril every 2 hours as needed for congestion. Past Week

## 2025-01-28 VITALS
HEART RATE: 71 BPM | WEIGHT: 175 LBS | OXYGEN SATURATION: 97 % | RESPIRATION RATE: 18 BRPM | TEMPERATURE: 98.4 F | BODY MASS INDEX: 31 KG/M2 | DIASTOLIC BLOOD PRESSURE: 56 MMHG | SYSTOLIC BLOOD PRESSURE: 114 MMHG

## 2025-01-28 LAB
C PNEUM DNA SPEC QL NAA+PROBE: NOT DETECTED
FLUAV H1 2009 PAND RNA SPEC QL NAA+PROBE: NOT DETECTED
FLUAV H1 RNA SPEC QL NAA+PROBE: NOT DETECTED
FLUAV H3 RNA SPEC QL NAA+PROBE: NOT DETECTED
FLUAV RNA SPEC QL NAA+PROBE: NOT DETECTED
FLUBV RNA SPEC QL NAA+PROBE: NOT DETECTED
HADV DNA SPEC QL NAA+PROBE: NOT DETECTED
HCOV PNL SPEC NAA+PROBE: NOT DETECTED
HMPV RNA SPEC QL NAA+PROBE: NOT DETECTED
HPIV1 RNA SPEC QL NAA+PROBE: NOT DETECTED
HPIV2 RNA SPEC QL NAA+PROBE: NOT DETECTED
HPIV3 RNA SPEC QL NAA+PROBE: NOT DETECTED
HPIV4 RNA SPEC QL NAA+PROBE: NOT DETECTED
M PNEUMO DNA SPEC QL NAA+PROBE: NOT DETECTED
RSV RNA SPEC QL NAA+PROBE: NOT DETECTED
RSV RNA SPEC QL NAA+PROBE: NOT DETECTED
RV+EV RNA SPEC QL NAA+PROBE: NOT DETECTED

## 2025-01-28 PROCEDURE — 250N000013 HC RX MED GY IP 250 OP 250 PS 637

## 2025-01-28 PROCEDURE — G0378 HOSPITAL OBSERVATION PER HR: HCPCS

## 2025-01-28 PROCEDURE — 250N000012 HC RX MED GY IP 250 OP 636 PS 637: Performed by: DIETITIAN, REGISTERED

## 2025-01-28 PROCEDURE — 99239 HOSP IP/OBS DSCHRG MGMT >30: CPT | Mod: GC | Performed by: DIETITIAN, REGISTERED

## 2025-01-28 PROCEDURE — 250N000013 HC RX MED GY IP 250 OP 250 PS 637: Performed by: DIETITIAN, REGISTERED

## 2025-01-28 RX ORDER — AMOXICILLIN 500 MG/1
1000 TABLET, FILM COATED ORAL 2 TIMES DAILY
Qty: 56 TABLET | Refills: 0 | Status: SHIPPED | OUTPATIENT
Start: 2025-01-28 | End: 2025-02-11

## 2025-01-28 RX ORDER — CLARITHROMYCIN 500 MG/1
500 TABLET ORAL 2 TIMES DAILY
Qty: 28 TABLET | Refills: 0 | Status: SHIPPED | OUTPATIENT
Start: 2025-01-28 | End: 2025-02-11

## 2025-01-28 RX ORDER — PREDNISONE 20 MG/1
40 TABLET ORAL DAILY
Qty: 8 TABLET | Refills: 0 | Status: SHIPPED | OUTPATIENT
Start: 2025-01-29 | End: 2025-02-02

## 2025-01-28 RX ADMIN — PANTOPRAZOLE SODIUM 40 MG: 40 TABLET, DELAYED RELEASE ORAL at 06:50

## 2025-01-28 RX ADMIN — SALINE NASAL SPRAY 1 SPRAY: 1.5 SOLUTION NASAL at 09:26

## 2025-01-28 RX ADMIN — PREDNISONE 40 MG: 20 TABLET ORAL at 09:25

## 2025-01-28 RX ADMIN — SALINE NASAL SPRAY 1 SPRAY: 1.5 SOLUTION NASAL at 09:55

## 2025-01-28 RX ADMIN — SALINE NASAL SPRAY 1 SPRAY: 1.5 SOLUTION NASAL at 06:51

## 2025-01-28 RX ADMIN — FLUTICASONE FUROATE AND VILANTEROL TRIFENATATE 1 PUFF: 100; 25 POWDER RESPIRATORY (INHALATION) at 09:26

## 2025-01-28 RX ADMIN — CEPHALEXIN 250 MG: 250 CAPSULE ORAL at 06:50

## 2025-01-28 RX ADMIN — CETIRIZINE HYDROCHLORIDE 10 MG: 10 TABLET, FILM COATED ORAL at 09:25

## 2025-01-28 ASSESSMENT — ACTIVITIES OF DAILY LIVING (ADL)
ADLS_ACUITY_SCORE: 30

## 2025-01-28 NOTE — PLAN OF CARE
Problem: Adult Inpatient Plan of Care  Goal: Plan of Care Review  Description: The Plan of Care Review/Shift note should be completed every shift.  The Outcome Evaluation is a brief statement about your assessment that the patient is improving, declining, or no change.  This information will be displayed automatically on your shift  note.  Outcome: Adequate for Care Transition     Problem: Adult Inpatient Plan of Care  Goal: Readiness for Transition of Care  Outcome: Adequate for Care Transition     AVS discussed with pt and . Pt discharging to home.

## 2025-01-28 NOTE — PLAN OF CARE
PRIMARY DIAGNOSIS: ACID REFLEX  OUTPATIENT/OBSERVATION GOALS TO BE MET BEFORE DISCHARGE:  ADLs back to baseline: Yes    Activity and level of assistance: Ambulating independently.    Pain status: Pain free.    Return to near baseline physical activity: Yes     Discharge Planner Nurse   Safe discharge environment identified: Yes  Barriers to discharge: No       Entered by: Shyla Buenrostro RN 01/28/2025 4:01 AM     Patient is alert and orientated times four. She denied pain when asked. IV saline locked. Denied SOB, Maintaining oxygen saturations above 90%. Duel skin assessment completed. Up independently to restroom.

## 2025-01-28 NOTE — PLAN OF CARE
PRIMARY DIAGNOSIS: ACID REFLEX  OUTPATIENT/OBSERVATION GOALS TO BE MET BEFORE DISCHARGE:  ADLs back to baseline: Yes    Activity and level of assistance: Ambulating independently.    Pain status: Pain free.    Return to near baseline physical activity: Yes     Discharge Planner Nurse   Safe discharge environment identified: Yes  Barriers to discharge: No       Entered by: Shyla Buenrostro RN 01/28/2025 4:53 AM     Patient resting comfortably ay this time in bed with eyes closed.

## 2025-01-28 NOTE — DISCHARGE SUMMARY
[facility]  Discharge Summary - Medicine & Pediatrics       Date of Admission:  1/27/2025  Date of Discharge:  1/28/2025  Discharging Provider: Dr. Tracee Mai, Dr. Aceves  Discharge Service: Hospitalist Service    Discharge Diagnoses   Hypoxia  Acid reflux  History of asthma  Acute post-operative pain    Follow-ups Needed After Discharge    Follow-up and recommended labs and tests:    Follow up with primary care provider, [facility], within 7 days for hospital follow- up.    -Please discuss H. pylori treatment (patient was instructed to start antibiotics on discharge)  -Consider switching from Advair inhaler to Symbicort for SMART therapy for asthma         Medication changes this admission:  Start keflex 250mg TID x7 days (1/27-2/2) for pharmacoprophylaxis surrounding sinus surgery.  Continue prednisone 40 mg daily x5 days (1/27-1/31) for asthma exacerbation.    Unresulted labs from this admission: None    Discharge Disposition   Discharged to home  Condition at discharge: Stable    Hospital Course   Claire Mora is a 34 year old female admitted on 1/27/2025. She has a history of GERD, H. Pylori, chronic pansinusitis s/p endoscopic sinus surgery on 1/24, and was admitted for a brief episode of acute hypoxic respiratory failure due to suspected asthma exacerbation.      The following problems were addressed during her hospitalization:    Acute hypoxic respiratory failure, resolved  Suspected asthma exacerbation versus allergic reaction  Hx of chronic pansinusitis s/p sinus surgery 1/24  Patient with a brief episode of hypoxia (84% on room air) while in the ED undergoing unremarkable workup for a headache. She has well controlled asthma on PTA medications. Has only had 1-2 asthma exacerbations in the past following taking ibuprofen. CT PE on admission negative for pneumonia or PE. It did show: Moderate scattered areas of mucous plugging throughout both lungs. EKG normal,  trop negative. VBG unremarkable. CT head and sinus with findings consistent with recent sinus surgery. Her hypoxia resolved after approximately 30 minutes and duoneb treatment, and she did not require any more supplemental oxygen. It is possible this brief episode of hypoxia was related to the dose of ketorolac that that patient received in the ED, although it is unclear whether her symptoms started before the dose was given. Her symptoms were treated as an asthma exacerbation despite full resolution of symptoms. Low suspicion for pneumonia.  - Prednisone 40mg x5 days (1/27-1/31)  - PTA Keflex 250 mg TID (1/27-2/2) for ppx around surgery  - PTA nasal sprays   - Pain: PTA dilaudid 2 mg PO Q6H PRN  - PTA Advair, recommend considering Symbicort as alternative inhaler on outpatient follow up  - PTA albuterol inhaler   - PTA cetrizine     GERD  H. Pylori  Hx of chronic Hep B  Patient has not yet re-started treatement for h. Pylori due to her recent surgery. She previously did not tolerate metronidazole. Has been prescribed amoxicillin and clarithromycin x14 days for this. Heartburn has been worse since surgery. Lipase and LFTs normal on admission. Per chart review, hep B labs last done in 8/2024 and liver US was negative for focal mass at that time.  - Instructed patient to start previously prescribed h. Pylori treatment:    - Omeprazole 20 mg BID x14 days   - Amoxicillin 1000 mg BID x14 days   - Clarithromycin 500 mg BID x14 days    Consultations This Hospital Stay   None    Code Status   Full Code       The patient was discussed with Dr. Ketan Mai MD  [address] [id number]  Phone: [phone]  Fax: [phone]  ______________________________________________________________________    Physical Exam   Vital Signs: Temp: 98.4  F (36.9  C) Temp src: Oral BP: 114/56 Pulse: 71   Resp: 18 SpO2: 97 % O2 Device: None (Room air) Oxygen  Delivery: 4 LPM  Weight: 175 lbs 0 oz  Constitutional: awake, alert, cooperative, no apparent distress, and appears stated age  Respiratory: No increased work of breathing, good air exchange, clear to auscultation bilaterally, no crackles, no wheezing  Cardiovascular: regular rate and rhythm and no edema  GI: soft, non-distended, non-tender  Skin: normal skin color, texture, turgor  Musculoskeletal: tone is normal      Primary Care Physician   [facility]    Discharge Orders      Reason for your hospital stay    You were hospitalized due to a brief episode of hypoxia while you are in the emergency department.  You were treated with oxygen support and we did imaging and workup which did not reveal a dangerous cause for your episode.  This may have been caused by a reaction to the NSAID medication you had received in the emergency department.  Please continue taking prednisone for the next 4 days and continue your prophylactic antibiotic (cephalexin) which was prescribed after your sinus surgery.  Also start your antibiotics for H. pylori (14-day course).    Follow-up with your PCP within 1 week.     Follow-up and recommended labs and tests     Follow up with primary care provider, [facility], within 7 days for hospital follow- up.    -Please discuss H. pylori treatment (patient was instructed to start antibiotics on discharge)  -Consider switching from Advair inhaler to Symbicort for SMART therapy for asthma     Activity    Your activity upon discharge: activity as tolerated     Diet    Follow this diet upon discharge: Current Diet:Orders Placed This Encounter      Regular Diet Adult       Significant Results and Procedures   Results for orders placed or performed during the hospital encounter of 01/27/25   CT Sinus w/o Contrast    Narrative    EXAM: CT SINUS W/O CONTRAST, CT HEAD W/O CONTRAST  LOCATION: [facility]  DATE: 1/27/2025    INDICATION: Recently  had sinus surgery, now having headaches.  COMPARISON: Sinus CT dated 10/30/2024.  TECHNIQUE:   1. Routine head CT without contrast. Multiplanar reformats. Dose reduction techniques were used.  2. Routine sinus CT without contrast. Multiplanar reformats. Dose reduction techniques were used.    FINDINGS:   HEAD CT: The ventricles appear normal in size and configuration. Normal brain parenchymal attenuation. No large transcortical acute or subacute infarct, acute intracranial hemorrhage, extra-axial fluid collection, or significant mass effect. Mildly   low-lying cerebellar tonsils extending approximately 2-3 mm below the level of the foramen magnum. Partially empty appearance of the sella with flattening of the pituitary tissue along the sellar floor.    The partially visualized orbits appear unremarkable. Please see separate report from sinus CT of same session for details regarding paranasal sinus findings. The mastoid and middle ear cavities appear grossly clear. No acute osseous abnormality of the   calvarium is identified.    SINUS CT:  Interval postsurgical changes status post bilateral maxillary antrostomy/uncinectomy and probable bilateral internal ethmoidectomy.    FRONTAL SINUSES: Complete soft tissue opacification of the left frontal sinus, unchanged. Slightly decreased moderate to severe mucosal thickening with aerated secretions in the right frontal sinus.    ETHMOID SINUSES: Changes of partial bilateral ethmoidectomy. There appears to be some nonspecific hypoattenuating surgical material within the bilateral ethmoidectomy defects/upper nasal cavities (series 4 image 24, series 3 image 23). Otherwise, there   is marked soft tissue opacification of the bilateral ethmoid sinuses.    SPHENOID SINUSES: Increased, moderate to severe, soft tissue opacification of the left sphenoid sinus. Slightly decreased, near complete soft tissue opacification of the right sphenoid sinus. There appear to be some aerated  secretions/layering fluid in   the bilateral sphenoid sinuses.     MAXILLARY SINUSES: Moderate to severe polypoid mucosal thickening  in the bilateral maxillary sinuses, slightly decreased on the right and increased on the left. Layering fluid/secretions in the bilateral maxillary sinuses. There is some hyperattenuating   material in the right maxillary sinus that could represent inspissated secretions or possibly blood products.    NASAL CAVITY/SKULL BASE: As above, there appears to be hypoattenuating nonspecific surgical material in the region of the ethmoidectomy defects and the upper aspects of the nasal cavities. There is soft tissue opacification of the nasal cavities   bilaterally, including the bilateral olfactory clefts. This may represent mucosal thickening and/or sinonasal polyps. Mild leftward deviation of the intact nasal septum. The cribriform plate appears to be grossly intact. The anterior clinoid processes   are not pneumatized.    PARANASAL SINUS DRAINAGE PATHWAYS:  The bilateral maxillary antrostomy defects appear be patent. There is complete soft tissue opacification/obstruction of the bilateral frontal and sphenoid sinus drainage pathways.    NON-SINUS STRUCTURES: The intraorbital soft tissue structures appear within normal limits. The bony orbits appear to be intact.      Impression    IMPRESSION:  1.  Status post bilateral maxillary antrostomy/uncinectomy and partial internal ethmoidectomy compared to the prior sinus CT. There appears to be hypoattenuating nonspecific surgical material in the bilateral ethmoidectomy defects extending into the   upper nasal cavities. Please correlate clinically with direct inspection.  2.  There is persistent moderate to severe/severe mucosal thickening throughout the paranasal sinuses, slightly improved in some areas and slightly progressed in others, as described. There is some hyperattenuating material in the right maxillary sinus   that could represent  inspissated secretions or possibly postsurgical blood products given the patient's history of recent surgery.  3.  Persistent abnormal soft tissue opacification of the upper nasal cavities including the olfactory clefts, which may be due to mucosal thickening and/or sinonasal polyps.  4.  The bony orbits appear intact. The intraorbital soft tissue structures appear unremarkable. No CT findings of acute intracranial process are identified. The cribriform plate appears to be grossly intact. No pneumocephalus.  5.  Partially empty appearance of the sella, which can be incidental, but has also been described in patients with idiopathic intracranial hypertension. Please correlate clinically.  6.  Mildly low-lying cerebellar tonsils.   Head CT w/o contrast    Narrative    EXAM: CT SINUS W/O CONTRAST, CT HEAD W/O CONTRAST  LOCATION: [facility]  DATE: 1/27/2025    INDICATION: Recently had sinus surgery, now having headaches.  COMPARISON: Sinus CT dated 10/30/2024.  TECHNIQUE:   1. Routine head CT without contrast. Multiplanar reformats. Dose reduction techniques were used.  2. Routine sinus CT without contrast. Multiplanar reformats. Dose reduction techniques were used.    FINDINGS:   HEAD CT: The ventricles appear normal in size and configuration. Normal brain parenchymal attenuation. No large transcortical acute or subacute infarct, acute intracranial hemorrhage, extra-axial fluid collection, or significant mass effect. Mildly   low-lying cerebellar tonsils extending approximately 2-3 mm below the level of the foramen magnum. Partially empty appearance of the sella with flattening of the pituitary tissue along the sellar floor.    The partially visualized orbits appear unremarkable. Please see separate report from sinus CT of same session for details regarding paranasal sinus findings. The mastoid and middle ear cavities appear grossly clear. No acute osseous abnormality of the   calvarium is  identified.    SINUS CT:  Interval postsurgical changes status post bilateral maxillary antrostomy/uncinectomy and probable bilateral internal ethmoidectomy.    FRONTAL SINUSES: Complete soft tissue opacification of the left frontal sinus, unchanged. Slightly decreased moderate to severe mucosal thickening with aerated secretions in the right frontal sinus.    ETHMOID SINUSES: Changes of partial bilateral ethmoidectomy. There appears to be some nonspecific hypoattenuating surgical material within the bilateral ethmoidectomy defects/upper nasal cavities (series 4 image 24, series 3 image 23). Otherwise, there   is marked soft tissue opacification of the bilateral ethmoid sinuses.    SPHENOID SINUSES: Increased, moderate to severe, soft tissue opacification of the left sphenoid sinus. Slightly decreased, near complete soft tissue opacification of the right sphenoid sinus. There appear to be some aerated secretions/layering fluid in   the bilateral sphenoid sinuses.     MAXILLARY SINUSES: Moderate to severe polypoid mucosal thickening  in the bilateral maxillary sinuses, slightly decreased on the right and increased on the left. Layering fluid/secretions in the bilateral maxillary sinuses. There is some hyperattenuating   material in the right maxillary sinus that could represent inspissated secretions or possibly blood products.    NASAL CAVITY/SKULL BASE: As above, there appears to be hypoattenuating nonspecific surgical material in the region of the ethmoidectomy defects and the upper aspects of the nasal cavities. There is soft tissue opacification of the nasal cavities   bilaterally, including the bilateral olfactory clefts. This may represent mucosal thickening and/or sinonasal polyps. Mild leftward deviation of the intact nasal septum. The cribriform plate appears to be grossly intact. The anterior clinoid processes   are not pneumatized.    PARANASAL SINUS DRAINAGE PATHWAYS:  The bilateral maxillary antrostomy  defects appear be patent. There is complete soft tissue opacification/obstruction of the bilateral frontal and sphenoid sinus drainage pathways.    NON-SINUS STRUCTURES: The intraorbital soft tissue structures appear within normal limits. The bony orbits appear to be intact.      Impression    IMPRESSION:  1.  Status post bilateral maxillary antrostomy/uncinectomy and partial internal ethmoidectomy compared to the prior sinus CT. There appears to be hypoattenuating nonspecific surgical material in the bilateral ethmoidectomy defects extending into the   upper nasal cavities. Please correlate clinically with direct inspection.  2.  There is persistent moderate to severe/severe mucosal thickening throughout the paranasal sinuses, slightly improved in some areas and slightly progressed in others, as described. There is some hyperattenuating material in the right maxillary sinus   that could represent inspissated secretions or possibly postsurgical blood products given the patient's history of recent surgery.  3.  Persistent abnormal soft tissue opacification of the upper nasal cavities including the olfactory clefts, which may be due to mucosal thickening and/or sinonasal polyps.  4.  The bony orbits appear intact. The intraorbital soft tissue structures appear unremarkable. No CT findings of acute intracranial process are identified. The cribriform plate appears to be grossly intact. No pneumocephalus.  5.  Partially empty appearance of the sella, which can be incidental, but has also been described in patients with idiopathic intracranial hypertension. Please correlate clinically.  6.  Mildly low-lying cerebellar tonsils.   CT Chest Pulmonary Embolism w Contrast    Narrative    EXAM: CT CHEST PULMONARY EMBOLISM W CONTRAST  LOCATION: [facility]  DATE: 1/27/2025    INDICATION: SOB, hypxoia  COMPARISON: 10/26/2023  TECHNIQUE: CT chest pulmonary angiogram during arterial phase injection of IV  contrast. Multiplanar reformats and MIP reconstructions were performed. Dose reduction techniques were used.   CONTRAST: 100 mL [id number]    FINDINGS:  ANGIOGRAM CHEST: Pulmonary arteries are normal caliber and negative for pulmonary emboli. Thoracic aorta is negative for dissection. No CT evidence of right heart strain.    LUNGS AND PLEURA: Moderate scattered mucous plugging throughout both lungs slightly more prominent in the lower lungs. No bronchiectasis. No focal infiltrates. Minimal inflammatory change in the lingula with some atelectasis and tiny subpleural 4 mm   nodule new since 10/26/2023 likely postinflammatory. No follow-up needed for this.    MEDIASTINUM/AXILLAE: Mild bilateral enlarged lymph nodes are mildly enlarged left axillary lymph nodes as minimally larger than prior study now measures 1.6 x 1.3 cm previously 1.3 x 1.0 cm.    CORONARY ARTERY CALCIFICATION: None.    UPPER ABDOMEN: Normal.    MUSCULOSKELETAL: Normal.      Impression    IMPRESSION:  1.  Negative for pulmonary emboli and negative for dissections.    2.  Moderate scattered areas of mucous plugging throughout both lungs. Nothing for pneumonia.    3.  Mildly enlarged bilateral hilar nodes most likely reactive.    4.  Mildly enlarged left axillary lymph node 1.6 x 1.3 cm minimally larger than 10/26/2023. Most likely reactive given this little change.       Discharge Medications   Current Discharge Medication List        CONTINUE these medications which have CHANGED    Details   amoxicillin (AMOXIL) 500 MG tablet Take 2 tablets (1,000 mg) by mouth 2 times daily for 14 days. X14 days  Qty: 56 tablet, Refills: 0    Associated Diagnoses: H. pylori infection      cephALEXin (KEFLEX) 250 MG capsule Take 1 capsule (250 mg) by mouth every 8 hours for 6 days.  Qty: 19 capsule, Refills: 0    Associated Diagnoses: Prophylactic antibiotic      clarithromycin (BIAXIN) 500 MG tablet Take 1 tablet (500 mg) by mouth 2 times daily for 14 days.  Qty: 28  tablet, Refills: 0    Associated Diagnoses: H. pylori infection           CONTINUE these medications which have NOT CHANGED    Details   acetaminophen (TYLENOL) 325 MG tablet Take 325 mg by mouth every 6 hours.      ADVAIR -21 MCG/ACT inhaler Inhale 2 puffs into the lungs 2 times daily      albuterol (PROAIR HFA/PROVENTIL HFA/VENTOLIN HFA) 108 (90 Base) MCG/ACT inhaler Inhale 2 puffs into the lungs every 6 hours as needed for shortness of breath      cetirizine (ZYRTEC) 10 MG tablet Take 10 mg by mouth daily.      fluticasone (FLONASE) 50 MCG/ACT nasal spray Spray 2 sprays into both nostrils as needed for allergies      HYDROmorphone (DILAUDID) 2 MG tablet Take 2-4 mg by mouth every 6 hours as needed for pain.      ondansetron (ZOFRAN) 4 MG tablet Take 4 mg by mouth 3 times daily as needed for nausea.      SODIUM CHLORIDE 0.65 % nasal spray Spray 1 spray in nostril every 2 hours as needed for congestion.      omeprazole (PRILOSEC) 20 MG DR capsule Take 20 mg by mouth 2 times daily. X 14 days           Allergies   Allergies   Allergen Reactions    Toradol [Ketorolac Tromethamine] Shortness Of Breath    Ibuprofen Cough     Trigger asthma, pt reported. d

## 2025-01-28 NOTE — PROGRESS NOTES
"PRIMARY DIAGNOSIS: \"GENERIC\" NURSING  OUTPATIENT/OBSERVATION GOALS TO BE MET BEFORE DISCHARGE:  ADLs back to baseline: Yes    Activity and level of assistance: Ambulating independently.    Pain status: Pain free.    Return to near baseline physical activity: Yes     Discharge Planner Nurse   Safe discharge environment identified: Yes  Barriers to discharge: Yes, not medically cleared.       Entered by: Chuckie Garcia RN 01/27/2025 10:45 PM     Please review provider order for any additional goals.   Nurse to notify provider when observation goals have been met and patient is ready for discharge.  "

## 2025-01-30 ENCOUNTER — PATIENT OUTREACH (OUTPATIENT)
Dept: CARE COORDINATION | Facility: CLINIC | Age: 35
End: 2025-01-30
Payer: COMMERCIAL

## 2025-01-30 NOTE — PROGRESS NOTES
"Clinic Care Coordination Contact  Transitions of Care Outreach  Chief Complaint   Patient presents with    Clinic Care Coordination - Post Hospital       Most Recent Admission Date: 1/27/2025   Most Recent Admission Diagnosis: Acid reflux - K21.9  Hypoxia - R09.02  History of asthma - Z87.09  Acute post-operative pain - G89.18     Most Recent Discharge Date: 1/28/2025   Most Recent Discharge Diagnosis: Acute post-operative pain - G89.18  Acid reflux - K21.9  Hypoxia - R09.02  History of asthma - Z87.09  Prophylactic antibiotic - Z79.2  H. pylori infection - A04.8  Mild intermittent asthma with exacerbation - J45.21     Transitions of Care Assessment    Discharge Assessment  How are you doing now that you are home?: \" I'm doing okay\"  How are your symptoms? (Red Flag symptoms escalate to triage hotline per guidelines): Improved  Do you know how to contact your clinic care team if you have future questions or changes to your health status? : Yes  Does the patient have their discharge instructions? : Yes  Does the patient have questions regarding their discharge instructions? : No  Were you started on any new medications or were there changes to any of your previous medications? : Yes  Does the patient have all of their medications?: Yes  Do you have questions regarding any of your medications? : No  Do you have all of your needed medical supplies or equipment (DME)?  (i.e. oxygen tank, CPAP, cane, etc.): Yes    Post-op ([facility] CTA Only)  If the patient had a surgery or procedure, do they have any questions for a nurse?: No           Follow up Plan     Discharge Follow-Up  Discharge follow up appointment scheduled in alignment with recommended follow up timeframe or Transitions of Risk Category? (Low = within 30 days; Moderate= within 14 days; High= within 7 days): Yes  Discharge Follow Up Appointment Date: 02/03/25  Discharge Follow Up Appointment Scheduled with?: Specialty Care Provider    No future " appointments.    Outpatient Plan as outlined on AVS reviewed with patient.    For any urgent concerns, please contact our 24 hour nurse triage line: [phone] ([phone])       Khloe Golden MA